# Patient Record
Sex: FEMALE | Race: WHITE
[De-identification: names, ages, dates, MRNs, and addresses within clinical notes are randomized per-mention and may not be internally consistent; named-entity substitution may affect disease eponyms.]

---

## 2018-09-05 ENCOUNTER — HOSPITAL ENCOUNTER (EMERGENCY)
Dept: HOSPITAL 97 - ER | Age: 68
Discharge: HOME | End: 2018-09-05
Payer: COMMERCIAL

## 2018-09-05 VITALS — TEMPERATURE: 98.6 F

## 2018-09-05 VITALS — DIASTOLIC BLOOD PRESSURE: 59 MMHG | SYSTOLIC BLOOD PRESSURE: 108 MMHG

## 2018-09-05 VITALS — OXYGEN SATURATION: 98 %

## 2018-09-05 DIAGNOSIS — Z88.8: ICD-10-CM

## 2018-09-05 DIAGNOSIS — N39.0: Primary | ICD-10-CM

## 2018-09-05 DIAGNOSIS — J44.9: ICD-10-CM

## 2018-09-05 DIAGNOSIS — I10: ICD-10-CM

## 2018-09-05 DIAGNOSIS — F41.9: ICD-10-CM

## 2018-09-05 DIAGNOSIS — E03.9: ICD-10-CM

## 2018-09-05 DIAGNOSIS — I50.9: ICD-10-CM

## 2018-09-05 DIAGNOSIS — Z79.82: ICD-10-CM

## 2018-09-05 LAB
BUN BLD-MCNC: 15 MG/DL (ref 7–18)
GLUCOSE SERPLBLD-MCNC: 157 MG/DL (ref 74–106)
HCT VFR BLD CALC: 39.2 % (ref 36–45)
LYMPHOCYTES # SPEC AUTO: 1.4 K/UL (ref 0.7–4.9)
MCH RBC QN AUTO: 29.6 PG (ref 27–35)
MCV RBC: 90.8 FL (ref 80–100)
PMV BLD: 9.4 FL (ref 7.6–11.3)
POTASSIUM SERPL-SCNC: 4.7 MMOL/L (ref 3.5–5.1)
RBC # BLD: 4.32 M/UL (ref 3.86–4.86)
UA COMPLETE W REFLEX CULTURE PNL UR: (no result)

## 2018-09-05 PROCEDURE — 51702 INSERT TEMP BLADDER CATH: CPT

## 2018-09-05 PROCEDURE — 93925 LOWER EXTREMITY STUDY: CPT

## 2018-09-05 PROCEDURE — 99285 EMERGENCY DEPT VISIT HI MDM: CPT

## 2018-09-05 PROCEDURE — 83880 ASSAY OF NATRIURETIC PEPTIDE: CPT

## 2018-09-05 PROCEDURE — 81015 MICROSCOPIC EXAM OF URINE: CPT

## 2018-09-05 PROCEDURE — 87088 URINE BACTERIA CULTURE: CPT

## 2018-09-05 PROCEDURE — 85025 COMPLETE CBC W/AUTO DIFF WBC: CPT

## 2018-09-05 PROCEDURE — 87186 SC STD MICRODIL/AGAR DIL: CPT

## 2018-09-05 PROCEDURE — 96375 TX/PRO/DX INJ NEW DRUG ADDON: CPT

## 2018-09-05 PROCEDURE — 71045 X-RAY EXAM CHEST 1 VIEW: CPT

## 2018-09-05 PROCEDURE — 74177 CT ABD & PELVIS W/CONTRAST: CPT

## 2018-09-05 PROCEDURE — 94640 AIRWAY INHALATION TREATMENT: CPT

## 2018-09-05 PROCEDURE — 96374 THER/PROPH/DIAG INJ IV PUSH: CPT

## 2018-09-05 PROCEDURE — 93005 ELECTROCARDIOGRAM TRACING: CPT

## 2018-09-05 PROCEDURE — 87086 URINE CULTURE/COLONY COUNT: CPT

## 2018-09-05 PROCEDURE — 36415 COLL VENOUS BLD VENIPUNCTURE: CPT

## 2018-09-05 PROCEDURE — 80048 BASIC METABOLIC PNL TOTAL CA: CPT

## 2018-09-05 PROCEDURE — 87077 CULTURE AEROBIC IDENTIFY: CPT

## 2018-09-05 NOTE — ER
Nurse's Notes                                                                                     

 [facility]                                                                

Name: Olga Figueroa                                                                                  

Age: 67 yrs                                                                                       

Sex: Female                                                                                       

: 1950                                                                                   

MRN: [id number]                                                                                   

Arrival Date: 2018                                                                          

Time: 13:20                                                                                       

Account#: [id number]                                                                            

Bed 24                                                                                            

Private MD:                                                                                       

Diagnosis: Urinary tract infection, site not specified                                            

                                                                                                  

Presentation:                                                                                     

                                                                                             

13:21 Presenting complaint: EMS states: possible bowel obstruction. Transition of care:       tl3 

      patient was received from another setting of care (long-term care facility), [facility]. Onset of symptoms is unknown. Risk Assessment: Do you want to hurt yourself or     

      someone else? Patient reports no desire to harm self or others. Initial Sepsis Screen:      

      Does the patient meet any 2 criteria? No. Patient's initial sepsis screen is negative.      

      Does the patient have a suspected source of infection? No. Patient's initial sepsis         

      screen is negative. Care prior to arrival:                                                  

13:21 Method Of Arrival: EMS: [facility]                                                tl3 

13:21 Acuity: LO 3                                                                           tl3 

                                                                                                  

Triage Assessment:                                                                                

13:31 General: Appears uncomfortable, obese, Behavior is calm, cooperative, appropriate for   tl3 

      age. Pain: Complains of pain in abdomen. EENT: No signs and/or symptoms were reported       

      regarding the EENT system. Neuro: Level of Consciousness is awake, alert, obeys             

      commands. Cardiovascular: Patient's skin is warm and dry. Respiratory: Airway is patent     

      Respiratory effort is even, unlabored, Respiratory pattern is regular, symmetrical,         

      Breath sounds are diminished bilaterally. in left lower lobe, right lower lobe, left        

      posterior lower lobe and right posterior lower lobe. GI: Abdomen is round distended,        

      Last BM was 2018. Bowel sounds present X 4 quads. abdomen firm. :           

      Reports frequent UTI. Derm: Skin is dusky, red, Skin temperature is cold bilateral          

      lower legs cold to touch, red/purple in color. Musculoskeletal: pt not ambulatory.          

                                                                                                  

Historical:                                                                                       

- Allergies:                                                                                      

13:31 oxybutynin;                                                                             tl3 

- Home Meds:                                                                                      

13:31 simethicone 80 mg Oral chew every 8 hours [Active]; pantoprazole 40 mg oral TbEC 1 tab  tl3 

      2 times per day [Active]; aspirin 81 mg Oral TbEC 1 tab once daily [Active]; prednisone     

      10 mg oral tab once daily [Active]; cetirizine 10 mg oral chew 1 tab once daily             

      [Active]; fluticasone 50 mcg/actuation nasal spsn 1 spray 2 times per day [Active];         

      ipratropium-albuterol 0.5 mg-3 mg(2.5 mg base)/3 mL Inhl nebu 3 mL 4 times per day          

      [Active]; temazepam 15 mg Oral cap 1 cap once daily [Active]; hydroxyzine HCl 25 mg         

      Oral tab 1 tab 4 times per day [Active]; tramadol 50 mg Oral tab 1 tab every 6 hours        

      for Pain [Active];                                                                          

- PMHx:                                                                                           

13:31 Anxiety; Cellulitis; cognitive communication deficit; constipation; COPD; HEART         3 

      FAILURE; Hypothyroidism; Hypertension; PERIPHERAL NEUROPATHY;                               

                                                                                                  

- Immunization history:: Adult Immunizations up to date.                                          

- Social history:: Smoking status: unknown.                                                       

- Ebola Screening: : No symptoms or risks identified at this time.                                

                                                                                                  

                                                                                                  

Screenin:36 Abuse screen: Denies threats or abuse. Nutritional screening: No deficits noted.        [facility] 

      Tuberculosis screening: No symptoms or risk factors identified. Fall Risk Secondary         

      diagnosis (15 points) impaired mobility.                                                    

                                                                                                  

Assessment:                                                                                       

13:36 Reassessment: No changes from previously documented assessment. pt brought in from [address] for possible bowel obstruction, pt required total changing due to being             

      saturated in urine.                                                                         

14:38 Reassessment: Patient appears in no apparent distress at this time. No changes from     [facility] 

      previously documented assessment. Patient and/or family updated on plan of care and         

      expected duration. Pain level reassessed. Patient is alert, oriented x 3, equal             

      unlabored respirations, skin warm/dry/pink. pt resting quietly, no needs at this time.      

16:11 Reassessment: Patient appears in no apparent distress at this time. No changes from     3 

      previously documented assessment. Patient and/or family updated on plan of care and         

      expected duration. Pain level reassessed. Patient is alert, oriented x 3, equal             

      unlabored respirations, skin warm/dry/pink. [facility] called for pt transport back to     

      facility.                                                                                   

17:12 Reassessment: Patient appears in no apparent distress at this time. No changes from     [facility] 

      previously documented assessment. Patient and/or family updated on plan of care and         

      expected duration. Pain level reassessed. Patient is alert, oriented x 3, equal             

      unlabored respirations, skin warm/dry/pink. ultrasound at bedside.                          

20:49 Reassessment: Patient appears in no apparent distress at this time. No changes from     tl3 

      previously documented assessment. Patient and/or family updated on plan of care and         

      expected duration. Pain level reassessed. Patient is alert, oriented x 3, equal             

      unlabored respirations, skin warm/dry/pink.                                                 

                                                                                                  

Vital Signs:                                                                                      

13:31  / 54; Pulse 75; Resp 18; Temp 98.6(O); Pulse Ox 98% on 2 lpm NC;                 tl3 

14:38  / 67; Pulse 73; Resp 16; Pulse Ox 99% on 2 lpm NC;                               tl3 

16:11  / 59; Pulse 76; Resp 18; Pulse Ox 99% on 2 lpm NC;                               tl3 

17:12 Pulse 82; Resp 18; Pulse Ox 99% 2 lpm ;                                                 tl3 

20:49  / 59; Pulse 76; Resp 18; Pulse Ox 98% on 2 lpm NC;                               tl3 

                                                                                                  

ED Course:                                                                                        

13:20 Patient arrived in ED.                                                                  iw  

13:21 Garima Schilling, RN is Primary Nurse.                                                     tl3 

13:23 Triage completed.                                                                       tl3 

13:27 Tia Killian FNP-C is PHCP.                                                        kb  

13:27 Mehrdad Dash MD is Attending Physician.                                             kb  

13:31 Arm band placed on left wrist.                                                          tl3 

13:36 Patient has correct armband on for positive identification. Placed in gown. Bed in low  tl3 

      position. Call light in reach. Side rails up X2. Pulse ox on. NIBP on. Warm blanket         

      given. Pillow given. sheets, bed clothes all changed due to being saturated in urine.       

13:36 No provider procedures requiring assistance completed.                                  tl3 

14:18 Inserted saline lock: 24 gauge in left ,using aseptic technique. shoulder Blood         tl3 

      collected.                                                                                  

14:37 Straight cath inserted, using sterile technique, Specimen obtained. 8fr.                tl3 

15:02 Patient moved to CT.                                                                    nj  

15:25 CT Abd/Pelvis - W/Contrast In Process Unspecified.                                      EDMS

15:27 CT completed. Patient tolerated procedure well. Patient moved back from CT.             nj  

17:05 Chest Single View XRAY In Process Unspecified.                                          EDMS

17:39 US LE Arterial Bilateral In Process Unspecified.                                        EDMS

20:49 IV discontinued, intact, bleeding controlled, No redness/swelling at site. Pressure     tl3 

      dressing applied.                                                                           

                                                                                                  

Administered Medications:                                                                         

16:01 Drug: Rocephin - (cefTRIAXone) 1 grams {Note: IVP.} Route: IVPB; Infused Over: 5 mins;  tl3 

      Site: Other;                                                                                

16:01 Follow up: IV Status: Completed infusion; IV Intake: 20ml                               tl3 

17:11 Drug: Lasix 40 mg Route: IVP; Infused Over: 2 mins; Site: Other;                        tl3 

18:39 Follow up: Response: No adverse reaction                                                tl3 

19:27 Drug: DuoNeb (3:1) (2.5 mg - 0.5 mg) 3 ml Route: Nebulizer;                             tl3 

20:37 Follow up: Response: No adverse reaction                                                tl3 

20:36 Drug: traMADol 50 mg Route: PO;                                                         tl3 

20:37 Follow up: Response: Medication administered at discharge.                              tl3 

                                                                                                  

                                                                                                  

Intake:                                                                                           

16:01 IV: 20ml; Total: 20ml.                                                                  tl3 

                                                                                                  

Outcome:                                                                                          

15:53 Discharge ordered by MD.                                                                kb  

20:32 Discharge ordered by MD.                                                                kb  

21:28 Discharged to nursing home. Report called to  [facility]                               tl3 

21:28 Condition: stable                                                                           

21:28 Discharge instructions given to patient, family, Instructed on discharge instructions,      

      follow up and referral plans. Demonstrated understanding of instructions, follow-up         

      care, medications, Prescriptions given X 1.                                                 

21:29 Patient left the ED.                                                                    tl3 

                                                                                                  

Addendum:                                                                                         

2018                                                                                        

     11:20 Addendum: Culture Results: Positive urine culture. Bacteria is resistant to, has        i
w

           intermediate sensitivity, or is not tested against prescribed antibiotics. Report given

           to DENISE for further evaluation and then to ED Manager for follow up with patient. Phone 

           call Attempt #1 called [facility], faxed culture report to Mary Curry, Attn:Kezia.  

                                                                                                  

Signatures:                                                                                       

Dispatcher [employer]                           EDTia Palomares, FNP-C                 FNP-Ckb                                                   

Park Hoff, RN                     RN   Jose Daniel Riggs Tammy, RN                       RN   tl3                                                  

                                                                                                  

Corrections: (The following items were deleted from the chart)                                    

                                                                                             

17:13 17:12 Pulse 82bpm; Resp 18bpm; Pulse Ox 99%; tl3                                        tl3 

                                                                                                  

**************************************************************************************************

## 2018-09-05 NOTE — EDPHYS
Physician Documentation                                                                           

 [facility]                                                                

Name: Olga Figueroa                                                                                  

Age: 67 yrs                                                                                       

Sex: Female                                                                                       

: 1950                                                                                   

MRN: [id number]                                                                                   

Arrival Date: 2018                                                                          

Time: 13:20                                                                                       

Account#: [id number]                                                                            

Bed 24                                                                                            

Private MD:                                                                                       

ED Physician Mehrdad Dash                                                                      

HPI:                                                                                              

                                                                                             

14:57 This 67 yrs old  Female presents to ER via EMS with complaints of abd pain.    kb  

14:57 The patient presents with abdominal pain. Onset: The symptoms/episode began/occurred 2  kb  

      week(s) ago. The symptoms do not radiate. Associated signs and symptoms: Pertinent          

      positives: "fullness". The symptoms are described as constant. Modifying factors: The       

      symptoms are alleviated by nothing, the symptoms are aggravated by nothing. Severity of     

      pain: At its worst the pain was mild moderate in the emergency department the pain is       

      unchanged. The patient has not experienced similar symptoms in the past. The patient        

      has not recently seen a physician. Pt sent from [facility] to rule out bowel               

      obstruction. Pt states she has felt fullness in abd for about 2 weeks. Last BM was          

      yesterday and normal. Hyperactive bowel sounds.                                             

                                                                                                  

Historical:                                                                                       

- Allergies:                                                                                      

13:31 oxybutynin;                                                                             tl3 

- Home Meds:                                                                                      

13:31 simethicone 80 mg Oral chew every 8 hours [Active]; pantoprazole 40 mg oral TbEC 1 tab  tl3 

      2 times per day [Active]; aspirin 81 mg Oral TbEC 1 tab once daily [Active]; prednisone     

      10 mg oral tab once daily [Active]; cetirizine 10 mg oral chew 1 tab once daily             

      [Active]; fluticasone 50 mcg/actuation nasal spsn 1 spray 2 times per day [Active];         

      ipratropium-albuterol 0.5 mg-3 mg(2.5 mg base)/3 mL Inhl nebu 3 mL 4 times per day          

      [Active]; temazepam 15 mg Oral cap 1 cap once daily [Active]; hydroxyzine HCl 25 mg         

      Oral tab 1 tab 4 times per day [Active]; tramadol 50 mg Oral tab 1 tab every 6 hours        

      for Pain [Active];                                                                          

- PMHx:                                                                                           

13:31 Anxiety; Cellulitis; cognitive communication deficit; constipation; COPD; HEART         tl3 

      FAILURE; Hypothyroidism; Hypertension; PERIPHERAL NEUROPATHY;                               

                                                                                                  

- Immunization history:: Adult Immunizations up to date.                                          

- Social history:: Smoking status: unknown.                                                       

- Ebola Screening: : No symptoms or risks identified at this time.                                

                                                                                                  

                                                                                                  

ROS:                                                                                              

14:19 Constitutional: Negative for fever, chills, and weight loss, Cardiovascular: Negative   kb  

      for chest pain, palpitations, and edema, Respiratory: Negative for shortness of breath,     

      cough, wheezing, and pleuritic chest pain, Back: Negative for injury and pain,              

      MS/Extremity: Negative for injury and deformity, Skin: Negative for injury, rash, and       

      discoloration, Neuro: Negative for headache, weakness, numbness, tingling, and seizure.     

14:19 Abdomen/GI: Positive for abdominal pain.                                                    

                                                                                                  

Exam:                                                                                             

14:55 Constitutional:  This is a well developed, well nourished patient who is awake, alert,  kb  

      and in no acute distress. Chest/axilla:  Normal chest wall appearance and motion.           

      Nontender with no deformity.  No lesions are appreciated. Cardiovascular:  Regular rate     

      and rhythm with a normal S1 and S2.  No gallops, murmurs, or rubs.  Normal PMI, no JVD.     

       No pulse deficits. Respiratory:  Lungs have equal breath sounds bilaterally, clear to      

      auscultation and percussion.  No rales, rhonchi or wheezes noted.  No increased work of     

      breathing, no retractions or nasal flaring.                                                 

14:55 Abdomen/GI: Inspection: obese Bowel sounds: normal, in all quadrants, Palpation:            

      moderate abdominal tenderness, in all quadrants.                                            

18:50 Respiratory:  Respirations: labored breathing, Breath sounds: wheezing: expiratory that kb  

      is mild, that is moderate, is heard diffusely, pt normally gets neb treatments              

      throughout the day for COPD.                                                                

                                                                                                  

Vital Signs:                                                                                      

13:31  / 54; Pulse 75; Resp 18; Temp 98.6(O); Pulse Ox 98% on 2 lpm NC;                 tl3 

14:38  / 67; Pulse 73; Resp 16; Pulse Ox 99% on 2 lpm NC;                               tl3 

16:11  / 59; Pulse 76; Resp 18; Pulse Ox 99% on 2 lpm NC;                               tl3 

17:12 Pulse 82; Resp 18; Pulse Ox 99% 2 lpm ;                                                 tl3 

20:49  / 59; Pulse 76; Resp 18; Pulse Ox 98% on 2 lpm NC;                               tl3 

                                                                                                  

MDM:                                                                                              

13:29 Patient medically screened.                                                             kb  

14:55 Data reviewed: vital signs, nurses notes. Data interpreted: Pulse oximetry: on 2L(s)    kb  

      per nasal canula, on home O2 is 99 %. Interpretation: normal.                               

15:52 Counseling: I had a detailed discussion with the patient and/or guardian regarding: the kb  

      historical points, exam findings, and any diagnostic results supporting the                 

      discharge/admit diagnosis, lab results, radiology results, the need for outpatient          

      follow up, a family practitioner, to return to the emergency department if symptoms         

      worsen or persist or if there are any questions or concerns that arise at home.             

16:29 ED course: Pt's daughter arrived and said the patient had been having shortness of      kb  

      breath and she thinks she is more swollen than normal. States they normally have to         

      admit her for IV lasix when she holds fluid.                                                

                                                                                                  

                                                                                             

13:45 Order name: Basic Metabolic Panel; Complete Time: 14:54                                 kb  

                                                                                             

13:45 Order name: CBC with Diff; Complete Time: 14:54                                         kb  

                                                                                             

13:45 Order name: Urine Microscopic Only; Complete Time: 15:32                                kb  

                                                                                             

14:55 Order name: CT Abd/Pelvis - W/Contrast; Complete Time: 15:45                            kb  

                                                                                             

15:29 Order name: Urine Culture                                                               EDMS

                                                                                             

16:31 Order name: BNP; Complete Time: 19:14                                                   kb  

                                                                                             

13:45 Order name: IV Saline Lock; Complete Time: 14:18                                        kb  

                                                                                             

16:31 Order name: Chest Single View XRAY; Complete Time: 17:47                                kb  

                                                                                             

16:37 Order name: US LE Arterial Bilateral; Complete Time: 17:51                              kb  

                                                                                             

18:19 Order name: EKG; Complete Time: 18:20                                                   kb  

                                                                                             

13:45 Order name: Labs collected and sent; Complete Time: 14:18                               kb  

                                                                                             

13:45 Order name: Urine Dipstick-Ancillary (obtain specimen); Complete Time: 14:41            kb  

                                                                                             

14:41 Order name: Cath; Complete Time: 14:41                                                  tl3 

                                                                                             

18:19 Order name: EKG - Nurse/Tech; Complete Time: 20:37                                      kb  

                                                                                                  

Administered Medications:                                                                         

16:01 Drug: Rocephin - (cefTRIAXone) 1 grams {Note: IVP.} Route: IVPB; Infused Over: 5 mins;  tl3 

      Site: Other;                                                                                

16:01 Follow up: IV Status: Completed infusion; IV Intake: 20ml                               tl3 

17:11 Drug: Lasix 40 mg Route: IVP; Infused Over: 2 mins; Site: Other;                        tl3 

18:39 Follow up: Response: No adverse reaction                                                tl3 

19:27 Drug: DuoNeb (3:1) (2.5 mg - 0.5 mg) 3 ml Route: Nebulizer;                             tl3 

20:37 Follow up: Response: No adverse reaction                                                tl3 

20:36 Drug: traMADol 50 mg Route: PO;                                                         tl3 

20:37 Follow up: Response: Medication administered at discharge.                              tl3 

                                                                                                  

                                                                                                  

Disposition:                                                                                      

                                                                                             

06:49 Co-signature as Attending Physician, Mehrdad Dash MD I agree with the assessment and  sami 

      plan of care.                                                                               

                                                                                                  

Disposition:                                                                                      

18 20:32 Discharged to Home. Impression: Urinary tract infection, site not specified.       

- Condition is Stable.                                                                            

- Discharge Instructions: Urinary Tract Infection, Adult, Easy-to-Read.                           

- Prescriptions for Augmentin 875- 125 mg Oral Tablet - take 1 tablet by ORAL route               

  every 12 hours for 10 days; 20 tablet.                                                          

- Medication Reconciliation Form, Thank You Letter, Antibiotic Education, Prescription            

  Opioid Use, SBAR form form.                                                                     

- Follow up: Emergency Department; When: As needed; Reason: Worsening of condition.               

  Follow up: Private Physician; When: 2 - 3 days; Reason: Recheck today's complaints,             

  Continuance of care, Re-evaluation by your physician.                                           

                                                                                                  

                                                                                                  

                                                                                                  

Signatures:                                                                                       

Dispatcher [employer]                           EDMS                                                 

Tia Killian, FNP-C                 MOP-Mehrdad Wheeler MD MD cha Lowrey, Tammy, RN                       RN   tl3                                                  

                                                                                                  

Corrections: (The following items were deleted from the chart)                                    

                                                                                             

14:57 14:55 Constitutional: This is a well developed, well nourished patient who is awake,    kb  

      alert, and in no acute distress. Chest/axilla: Normal chest wall appearance and motion.     

      Nontender with no deformity. No lesions are appreciated. Cardiovascular: Regular rate       

      and rhythm with a normal S1 and S2. No gallops, murmurs, or rubs. Normal PMI, no JVD.       

      No pulse deficits. Respiratory: Lungs have equal breath sounds bilaterally, clear to        

      auscultation and percussion. No rales, rhonchi or wheezes noted. No increased work of       

      breathing, no retractions or nasal flaring. MS/ Extremity: Pulses equal, no cyanosis.       

      Neurovascular intact. Full, normal range of motion. Neuro: Awake and alert, GCS 15,         

      oriented to person, place, time, and situation. Cranial nerves II-XII grossly intact.       

      Motor strength 5/5 in all extremities. Sensory grossly intact. Cerebellar exam normal.      

      Normal gait. kb                                                                             

16:29 15:53 2018 15:53 Discharged to Home. Impression: Urinary tract infection, site    kb  

      not specified. Condition is Stable. Forms are Medication Reconciliation Form, Thank You     

      Letter, Antibiotic Education, Prescription Opioid Use. Follow up: Emergency Department;     

      When: As needed; Reason: Worsening of condition. Follow up: Private Physician; When: 2      

      - 3 days; Reason: Recheck today's complaints, Continuance of care, Re-evaluation by         

      your physician. kb                                                                          

18:50 14:55 Constitutional: This is a well developed, well nourished patient who is awake,    kb  

      alert, and in no acute distress. Chest/axilla: Normal chest wall appearance and motion.     

      Nontender with no deformity. No lesions are appreciated. Cardiovascular: Regular rate       

      and rhythm with a normal S1 and S2. No gallops, murmurs, or rubs. Normal PMI, no JVD.       

      No pulse deficits. Respiratory: Lungs have equal breath sounds bilaterally, clear to        

      auscultation and percussion. No rales, rhonchi or wheezes noted. No increased work of       

      breathing, no retractions or nasal flaring. kb                                              

18:51 14:55 Data interpreted: Pulse oximetry: on room air is 99 %. Interpretation: normal. kb kb  

18:52 14:55 Data interpreted: Pulse oximetry: on on home O2 is 99 %. Interpretation: normal.  kb  

      kb                                                                                          

21:29 20:32 2018 20:32 Discharged to Home. Impression: Urinary tract infection, site    tl3 

      not specified. Condition is Stable. Discharge Instructions: Urinary Tract Infection,        

      Adult, Easy-to-Read. Prescriptions for Macrobid 100 mg Oral Capsule - take 1 capsule by     

      ORAL route every 12 hours for 7 days; 14 capsule, Augmentin 875-125 mg Oral Tablet -        

      take 1 tablet by ORAL route every 12 hours for 10 days; 20 tablet. and Forms are            

      Medication Reconciliation Form, Thank You Letter, Antibiotic Education, Prescription        

      Opioid Use. Follow up: Emergency Department; When: As needed; Reason: Worsening of          

      condition. Follow up: Private Physician; When: 2 - 3 days; Reason: Recheck today's          

      complaints, Continuance of care, Re-evaluation by your physician. kb                        

                                                                                                  

**************************************************************************************************

## 2018-09-05 NOTE — RAD REPORT
EXAM DESCRIPTION:  CTAbdomen   Pelvis W Contrast - 9/5/2018 3:25 pm

 

CLINICAL HISTORY:  Abdominal pain.

IV  contrast only;Abd pain

 

COMPARISON:  CT ABD PELVIS W CONTRAST dated 3/23/2015

 

TECHNIQUE:  Biphasic CT imaging of the abdomen and pelvis was performed with 100 ml non-ionic IV cont
rast.

 

All CT scans are performed using dose optimization technique as appropriate and may include automated
 exposure control or mA/KV adjustment according to patient size.

 

FINDINGS:  The lung bases are clear.

 

The liver demonstrates no focal mass or biliary dilatation. Cholecystectomy clips are seen. Spleen, p
ancreas, adrenal glands and kidneys show no acute process.

 

No bowel obstruction, free air, free fluid or abscess.  The appendix is normal.  No evidence of signi
ficant lymphadenopathy. Moderate aortic atherosclerosis.

 

Left total hip arthroplasty is noted.

 

IMPRESSION:  No acute intra-abdominal or pelvic finding.

## 2018-09-05 NOTE — RAD REPORT
EXAM DESCRIPTION:  US - Lower Extremity Arterial Bilat - 9/5/2018 5:39 pm

 

CLINICAL HISTORY:  Claudication

 

COMPARISON:  Lower Extremity Arterial Bilat dated 1/28/2017

 

TECHNIQUE:  Bilateral lower extremity arterial Doppler examination was performed.

 

FINDINGS:  ABIs were not requested.

 

Triphasic waveforms are noted in the right lower extremity arterial system. No high-grade stenosis or
 occlusion is suspected.

 

Triphasic and biphasic waveforms are seen in the left lower extremity arterial system. No high-grade 
stenosis or occlusion suspected.

 

IMPRESSION:  No evidence of an arterial occlusion or high-grade stenosis.

## 2018-09-06 NOTE — EKG
Test Date:    2018-09-05               Test Time:    20:21:36

Technician:   TL                                     

                                                     

MEASUREMENT RESULTS:                                       

Intervals:                                           

Rate:         96                                     

SD:           122                                    

QRSD:         84                                     

QT:           426                                    

QTc:          538                                    

Axis:                                                

P:            66                                     

SD:           122                                    

QRS:          28                                     

T:            132                                    

                                                     

INTERPRETIVE STATEMENTS:                                       

                                                     

Normal sinus rhythm

Right atrial enlargement

Low voltage QRS

ST & T wave abnormality, consider lateral ischemia

Prolonged QT

Abnormal ECG

Compared to ECG 08/13/2017 13:54:04

Low QRS voltage now present

ST (T wave) deviation now present

Possible ischemia now present

Prolonged QT interval now present

Sinus tachycardia no longer present

Myocardial infarct finding no longer present



Electronically Signed On 09-06-18 06:47:12 CDT by Elias Barcenas

## 2019-03-01 ENCOUNTER — HOSPITAL ENCOUNTER (INPATIENT)
Dept: HOSPITAL 97 - ER | Age: 69
LOS: 3 days | Discharge: NURSING, REHAB OR LONG-TERM CARE | DRG: 190 | End: 2019-03-04
Attending: FAMILY MEDICINE | Admitting: HOSPITALIST
Payer: COMMERCIAL

## 2019-03-01 VITALS — BODY MASS INDEX: 36.3 KG/M2

## 2019-03-01 DIAGNOSIS — M54.9: ICD-10-CM

## 2019-03-01 DIAGNOSIS — E24.2: ICD-10-CM

## 2019-03-01 DIAGNOSIS — G89.29: ICD-10-CM

## 2019-03-01 DIAGNOSIS — Z99.81: ICD-10-CM

## 2019-03-01 DIAGNOSIS — Z87.891: ICD-10-CM

## 2019-03-01 DIAGNOSIS — I11.0: ICD-10-CM

## 2019-03-01 DIAGNOSIS — E03.9: ICD-10-CM

## 2019-03-01 DIAGNOSIS — J96.22: ICD-10-CM

## 2019-03-01 DIAGNOSIS — E78.5: ICD-10-CM

## 2019-03-01 DIAGNOSIS — K21.9: ICD-10-CM

## 2019-03-01 DIAGNOSIS — E66.9: ICD-10-CM

## 2019-03-01 DIAGNOSIS — J44.1: Primary | ICD-10-CM

## 2019-03-01 DIAGNOSIS — F32.9: ICD-10-CM

## 2019-03-01 DIAGNOSIS — I50.32: ICD-10-CM

## 2019-03-01 LAB
ALBUMIN SERPL BCP-MCNC: 4 G/DL (ref 3.4–5)
ALP SERPL-CCNC: 114 U/L (ref 45–117)
ALT SERPL W P-5'-P-CCNC: 27 U/L (ref 12–78)
AST SERPL W P-5'-P-CCNC: 13 U/L (ref 15–37)
BLD SMEAR INTERP: (no result)
BUN BLD-MCNC: 16 MG/DL (ref 7–18)
GLUCOSE SERPLBLD-MCNC: 171 MG/DL (ref 74–106)
HCT VFR BLD CALC: 43.2 % (ref 36–45)
INR BLD: 1
LYMPHOCYTES # SPEC AUTO: 1.3 K/UL (ref 0.7–4.9)
MAGNESIUM SERPL-MCNC: 2.4 MG/DL (ref 1.8–2.4)
MORPHOLOGY BLD-IMP: (no result)
NT-PROBNP SERPL-MCNC: 198 PG/ML (ref ?–125)
PMV BLD: 9.2 FL (ref 7.6–11.3)
POTASSIUM SERPL-SCNC: 4.7 MMOL/L (ref 3.5–5.1)
RBC # BLD: 4.61 M/UL (ref 3.86–4.86)
TROPONIN (EMERG DEPT USE ONLY): < 0.02 NG/ML (ref 0–0.04)
UA COMPLETE W REFLEX CULTURE PNL UR: (no result)

## 2019-03-01 PROCEDURE — 80076 HEPATIC FUNCTION PANEL: CPT

## 2019-03-01 PROCEDURE — 71045 X-RAY EXAM CHEST 1 VIEW: CPT

## 2019-03-01 PROCEDURE — 82805 BLOOD GASES W/O2 SATURATION: CPT

## 2019-03-01 PROCEDURE — 99285 EMERGENCY DEPT VISIT HI MDM: CPT

## 2019-03-01 PROCEDURE — 94668 MNPJ CHEST WALL SBSQ: CPT

## 2019-03-01 PROCEDURE — 80048 BASIC METABOLIC PNL TOTAL CA: CPT

## 2019-03-01 PROCEDURE — 93005 ELECTROCARDIOGRAM TRACING: CPT

## 2019-03-01 PROCEDURE — 83605 ASSAY OF LACTIC ACID: CPT

## 2019-03-01 PROCEDURE — 94667 MNPJ CHEST WALL 1ST: CPT

## 2019-03-01 PROCEDURE — 84484 ASSAY OF TROPONIN QUANT: CPT

## 2019-03-01 PROCEDURE — 83735 ASSAY OF MAGNESIUM: CPT

## 2019-03-01 PROCEDURE — 80053 COMPREHEN METABOLIC PANEL: CPT

## 2019-03-01 PROCEDURE — 85610 PROTHROMBIN TIME: CPT

## 2019-03-01 PROCEDURE — 96374 THER/PROPH/DIAG INJ IV PUSH: CPT

## 2019-03-01 PROCEDURE — 51702 INSERT TEMP BLADDER CATH: CPT

## 2019-03-01 PROCEDURE — 36415 COLL VENOUS BLD VENIPUNCTURE: CPT

## 2019-03-01 PROCEDURE — 81015 MICROSCOPIC EXAM OF URINE: CPT

## 2019-03-01 PROCEDURE — 85025 COMPLETE CBC W/AUTO DIFF WBC: CPT

## 2019-03-01 PROCEDURE — 84145 PROCALCITONIN (PCT): CPT

## 2019-03-01 PROCEDURE — 81003 URINALYSIS AUTO W/O SCOPE: CPT

## 2019-03-01 PROCEDURE — 83880 ASSAY OF NATRIURETIC PEPTIDE: CPT

## 2019-03-01 PROCEDURE — 94640 AIRWAY INHALATION TREATMENT: CPT

## 2019-03-01 PROCEDURE — 87040 BLOOD CULTURE FOR BACTERIA: CPT

## 2019-03-01 RX ADMIN — METHYLPREDNISOLONE SODIUM SUCCINATE SCH MG: 125 INJECTION, POWDER, FOR SOLUTION INTRAMUSCULAR; INTRAVENOUS at 23:28

## 2019-03-01 NOTE — ER
Nurse's Notes                                                                                     

 [facility]                                                                

Name: Olga Figueroa                                                                                  

Age: 68 yrs                                                                                       

Sex: Female                                                                                       

: 1950                                                                                   

MRN: [id number]                                                                                   

Arrival Date: 2019                                                                          

Time: 18:17                                                                                       

Account#: [id number]                                                                            

Bed 6                                                                                             

Private MD:                                                                                       

Diagnosis: Chronic obstructive pulmonary disease with (acute) exacerbation                        

                                                                                                  

Presentation:                                                                                     

                                                                                             

18:17 Presenting complaint: EMS states: Pt having shortness of breath worsening today. Pt's   jl7 

      daughter reports she is confused and hallucinating. Transition of care: patient was         

      received from another setting of care (long-term care [facility]), [facility]. Onset of      

      symptoms was 2019. Risk Assessment: Do you want to hurt yourself or someone       

      else? Patient reports no desire to harm self or others. Initial Sepsis Screen: Does the     

      patient meet any 2 criteria? No. Patient's initial sepsis screen is negative. Does the      

      patient have a suspected source of infection? No. Patient's initial sepsis screen is        

      negative. Care prior to arrival: Medication(s) given: Albuterol Neb x 1, Atrovent Neb x     

      1.                                                                                          

18:17 Method Of Arrival: EMS: [facility]                                                [facility] 

18:17 Acuity: LO 3                                                                           jl7 

                                                                                                  

Triage Assessment:                                                                                

18:32 General: Appears in no apparent distress. uncomfortable, Behavior is cooperative. Pain: jl7 

      Complains of pain in mid-sternal area Pain radiates to "straight through to the back."      

      Pain currently is 8 out of 10 on a pain scale. Quality of pain is described as sharp,       

      Pain began 1 day ago. Is continuous. EENT: No signs and/or symptoms were reported           

      regarding the EENT system. Neuro: Level of Consciousness is awake, alert, obeys             

      commands, Oriented to person, place. Cardiovascular: Patient's skin is warm and dry.        

      Respiratory: Reports shortness of breath at rest Airway is patent Respiratory effort is     

      even, unlabored, Respiratory pattern is regular, symmetrical, Onset: The                    

      symptoms/episode began/occurred at an unknown time. the patient has moderate shortness      

      of breath. GI: No signs and/or symptoms were reported involving the gastrointestinal        

      system. : No signs and/or symptoms were reported regarding the genitourinary system.      

                                                                                                  

Historical:                                                                                       

- Allergies:                                                                                      

18:32 oxybutynin;                                                                             jl7 

- Home Meds:                                                                                      

18:32 temazepam 15 mg Oral cap 1 cap once daily [Active]; pantoprazole 40 mg Oral TbEC 1 tab  jl7 

      2 times per day [Active]; Triamcinolone Acetonide Topical [Active]; spironolactone 50       

      mg Oral tab 1 tab once daily [Active]; gabapentin 400 mg oral cap 3 times per day           

      [Active]; Cymbalta oral 90 mg oral once daily [Active]; Lasix 20 mg Oral tab 1 tab 3        

      times per day [Active]; alprazolam 1 mg Oral tab 1 tab 3 times per day [Active];            

      Seroquel 100 mg Oral tab 2 tabs daily [Active]; levothyroxine 25 mcg tab 1 tab once         

      daily [Active]; atorvastatin 10 mg oral tab 1 tab once daily [Active]; Myrbetriq 25 mg      

      oral Tb24 1 tab once daily [Active]; fluticasone 50 mcg/actuation nasal spsn 1 spray 2      

      times per day [Active]; cetirizine 10 mg Oral chew 1 tab once daily [Active]; aspirin       

      81 mg Oral TbEC 1 tab once daily [Active]; prednisone 10 mg Oral tab once daily             

      [Active]; ipratropium-albuterol 0.5 mg-3 mg(2.5 mg base)/3 mL Inhl nebu 3 mL 4 times        

      per day [Active]; tramadol 50 mg Oral tab 1 tab every 6 hours for Pain [Active];            

      hydroxyzine HCl 25 mg Oral tab 1 tab 4 times per day [Active]; simethicone 80 mg Oral       

      chew every 8 hours [Active];                                                                

- PMHx:                                                                                           

18:32 Anxiety; cognitive communication deficit; Cellulitis; constipation; COPD; HEART         jl7 

      FAILURE; Hypertension; Hypothyroidism; PERIPHERAL NEUROPATHY; Bipolar disorder; Anemia;     

      Depression; Emphysema; Dementia;                                                            

- PSHx:                                                                                           

18:32 left Hip;                                                                               jl7 

                                                                                                  

- Immunization history:: Adult Immunizations unknown.                                             

- Social history:: Smoking status: unknown.                                                       

- Ebola Screening: : No symptoms or risks identified at this time.                                

                                                                                                  

                                                                                                  

Screenin:48 Abuse screen: Denies threats or abuse. Denies injuries from another. Nutritional        jl7 

      screening: No deficits noted. Tuberculosis screening: No symptoms or risk factors           

      identified. Fall Risk IV access (20 points). Total Acuna Fall Scale indicates No Risk       

      (0-24 pts).                                                                                 

                                                                                                  

Assessment:                                                                                       

18:48 General: Izabela, pt's daughter, called and reported "She is confused, the doctor         jl7 

      increased her water pill but she has fluid pockets on her thighs.". General: See triage     

      assessment. Cardiovascular: Rhythm is regular. Respiratory: Airway is patent                

      Respiratory effort is even, labored, Respiratory pattern is congestion auscultated.         

19:25 General: Appears uncomfortable, Behavior is appropriate for age. General: Appears       ea  

      obese. Neuro: Level of Consciousness is awake, alert, Oriented to person, place.            

      Cardiovascular: Patient's skin is warm and dry. Respiratory: Airway is patent               

      Respiratory effort is even, labored, Respiratory pattern is regular, symmetrical,           

      Breath sounds are coarse bilaterally. GI: Abdomen is distended, Bowel sounds present X      

      4 quads. Derm: Discoloration noted to hudson lower extremities, dark brown bruising noted      

      to hudson forearms.                                                                            

20:00 Reassessment: Patient and/or family updated on plan of care and expected duration. Pain ea  

      level reassessed. Patient is alert, oriented x 3, equal unlabored respirations, skin        

      warm/dry/pink.                                                                              

21:50 Reassessment: Patient and/or family updated on plan of care and expected duration. Pain ea  

      level reassessed. Patient is alert, oriented x 3, equal unlabored respirations, skin        

      warm/dry/pink.                                                                              

22:30 Reassessment: Patient and/or family updated on plan of care and expected duration. Pain ea  

      level reassessed. Patient is alert, oriented x 3, equal unlabored respirations, skin        

      warm/dry/pink.                                                                              

22:31 Reassessment: Report given to Herbert CHARLES on second floor.                                 ea  

                                                                                                  

Vital Signs:                                                                                      

18:32  / 75; Pulse 82; Resp 14 S; Pulse Ox 98% on Nebulizer Mask; Pain 8/10;            jl7 

18:45 Pulse 89; Resp 17 S; Temp 98.5; Pulse Ox 87% on R/A;                                    jl7 

18:47 Pulse 86; Resp 16 S; Pulse Ox 92% on 2 lpm NC;                                          jl7 

19:50  / 69; Pulse 96; Resp 22; Pulse Ox 94% on 2 lpm NC;                               tl2 

20:15  / 69; Pulse 80; Resp 21 S; Pulse Ox 96% 2 lpm ;                                  ea  

21:47  / 63; Pulse 89; Resp 22; Pulse Ox 96% on 2 lpm NC;                               ea  

22:32  / 68; Pulse 88; Resp 19; Pulse Ox 96% on 2 lpm NC;                               ea  

                                                                                                  

ED Course:                                                                                        

18:17 Patient arrived in ED.                                                                  jl7 

18:18 Abundio King PA is PHCP.                                                               jr8 

18:18 Lorne Caruso MD is Attending Physician.                                              jr8 

18:19 Triage completed.                                                                       jl7 

18:32 Arm band placed on right wrist.                                                         jl7 

18:35 EKG done, by ED staff, reviewed by Abundio BECKFORD.                                      jp3 

18:37 Inserted saline lock: 22 gauge in right hand, using aseptic technique. Blood collected. bp  

18:48 Patient has correct armband on for positive identification. Bed in low position. Call   jl7 

      light in reach. Side rails up X2. Cardiac monitor on. Pulse ox on. NIBP on.                 

19:11 X-ray completed. Portable x-ray completed in exam room. Patient tolerated procedure     mh1 

      well.                                                                                       

19:13 XRAY Chest (1 view) In Process Unspecified.                                             EDMS

19:25 Chrissie Myers, MELVIN is Primary Nurse.                                                    ea  

20:48 Mahogany Gan MD is Hospitalizing Provider.                                           jr8 

21:30 Early cath inserted, using sterile technique, 16 Fr., by me, balloon inflated, to       ea  

      gravity drainage, urine specimen collected. returned clear yellow urine. Patient            

      tolerated well.                                                                             

22:32 No provider procedures requiring assistance completed. Patient admitted, IV remains in  ea  

      place.                                                                                      

                                                                                                  

Administered Medications:                                                                         

21:47 Drug: Lasix 20 mg Route: IVP; Site: right hand;                                         ea  

22:25 Follow up: Response: No adverse reaction                                                ea  

                                                                                                  

                                                                                                  

Outcome:                                                                                          

20:48 Decision to Hospitalize by Provider.                                                    jr8 

21:00 Instructed on the need for admit.                                                       ea  

22:36 Admitted to Med/surg accompanied by tech, room 231, with oxygen, with chart, Report     ea  

      called to  Herbert CHARLES on second floor.                                                        

22:36 Condition: stable                                                                           

23:03 Patient left the ED.                                                                    ea  

                                                                                                  

Signatures:                                                                                       

Dispatcher [employer]                           EDMS                                                 

Britt Tinajero                               1                                                  

Abundio King PA PA   jr8                                                  

Vickie Blanco, RN                        RN   tl2                                                  

Tonya Archer RN                        RN   jl7                                                  

Chrissie Myers, Norris Jacobo RN, ea, RN                      RN   [place]                              jp3                                                  

                                                                                                  

Corrections: (The following items were deleted from the chart)                                    

18:47 18:32  / 44; Pulse 82bpm; Resp 14bpm; Spontaneous; Pulse Ox 98% Nebulizer Mask;   jl7 

      Pain 8/10; jl7                                                                              

18:48 18:17 Presenting complaint: EMS states: Pt having shortness of breath worsening today   jl7 

      jl7                                                                                         

                                                                                                  

**************************************************************************************************

## 2019-03-01 NOTE — RAD REPORT
EXAM DESCRIPTION:  RAD - Chest Single View - 3/1/2019 7:14 pm

 

CLINICAL HISTORY:  DYSPNEA

Chest pain.

 

COMPARISON:  Chest Single View dated 9/5/2018; Chest Single View dated 2/10/2018; Chest Single View d
ated 8/13/2017; Chest Single View dated 6/14/2017

 

FINDINGS:  Portable technique limits examination quality.

 

The lungs are grossly clear. Tortuous thoracic aorta with the heart size upper limit of normal. Right
 total shoulder arthroplasty.

 

IMPRESSION:  No acute intrathoracic process suspected.

## 2019-03-01 NOTE — EDPHYS
Physician Documentation                                                                           

 [facility]                                                                

Name: Olga Figueroa                                                                                  

Age: 68 yrs                                                                                       

Sex: Female                                                                                       

: 1950                                                                                   

MRN: [id number]                                                                                   

Arrival Date: 2019                                                                          

Time: 18:17                                                                                       

Account#: [id number]                                                                            

Bed 6                                                                                             

Private MD:                                                                                       

ED Physician Lorne Caruso                                                                       

HPI:                                                                                              

                                                                                             

19:40 This 68 yrs old  Female presents to ER via EMS with complaints of Shortness Of jr8 

      Breath.                                                                                     

19:40 The patient has shortness of breath at rest. Onset: The symptoms/episode began/occurred jr8 

      gradually, 2 day(s) ago, and became worse and became persistent. Duration: The symptoms     

      are continuous. The patient's shortness of breath is aggravated by talking. Associated      

      signs and symptoms: The patient has no apparent associated signs or symptoms. Severity      

      of symptoms: At their worst the symptoms were moderate in the emergency department the      

      symptoms have improved. It is unknown whether or not the patient has had similar            

      symptoms in the past. The patient has not recently seen a physician.                        

                                                                                                  

Historical:                                                                                       

- Allergies:                                                                                      

18:32 oxybutynin;                                                                             jl7 

- Home Meds:                                                                                      

18:32 temazepam 15 mg Oral cap 1 cap once daily [Active]; pantoprazole 40 mg Oral TbEC 1 tab  jl7 

      2 times per day [Active]; Triamcinolone Acetonide Topical [Active]; spironolactone 50       

      mg Oral tab 1 tab once daily [Active]; gabapentin 400 mg oral cap 3 times per day           

      [Active]; Cymbalta oral 90 mg oral once daily [Active]; Lasix 20 mg Oral tab 1 tab 3        

      times per day [Active]; alprazolam 1 mg Oral tab 1 tab 3 times per day [Active];            

      Seroquel 100 mg Oral tab 2 tabs daily [Active]; levothyroxine 25 mcg tab 1 tab once         

      daily [Active]; atorvastatin 10 mg oral tab 1 tab once daily [Active]; Myrbetriq 25 mg      

      oral Tb24 1 tab once daily [Active]; fluticasone 50 mcg/actuation nasal spsn 1 spray 2      

      times per day [Active]; cetirizine 10 mg Oral chew 1 tab once daily [Active]; aspirin       

      81 mg Oral TbEC 1 tab once daily [Active]; prednisone 10 mg Oral tab once daily             

      [Active]; ipratropium-albuterol 0.5 mg-3 mg(2.5 mg base)/3 mL Inhl nebu 3 mL 4 times        

      per day [Active]; tramadol 50 mg Oral tab 1 tab every 6 hours for Pain [Active];            

      hydroxyzine HCl 25 mg Oral tab 1 tab 4 times per day [Active]; simethicone 80 mg Oral       

      chew every 8 hours [Active];                                                                

- PMHx:                                                                                           

18:32 Anxiety; cognitive communication deficit; Cellulitis; constipation; COPD; HEART         jl7 

      FAILURE; Hypertension; Hypothyroidism; PERIPHERAL NEUROPATHY; Bipolar disorder; Anemia;     

      Depression; Emphysema; Dementia;                                                            

- PSHx:                                                                                           

18:32 left Hip;                                                                               jl7 

                                                                                                  

- Immunization history:: Adult Immunizations unknown.                                             

- Social history:: Smoking status: unknown.                                                       

- Ebola Screening: : No symptoms or risks identified at this time.                                

                                                                                                  

                                                                                                  

ROS:                                                                                              

19:40 Eyes: Negative for injury, pain, redness, and discharge, ENT: Negative for injury,      jr8 

      pain, and discharge, Neck: Negative for injury, pain, and swelling, Cardiovascular:         

      Negative for chest pain, palpitations, and edema, Abdomen/GI: Negative for abdominal        

      pain, nausea, vomiting, diarrhea, and constipation, Back: Negative for injury and pain,     

      MS/Extremity: Negative for injury and deformity, Skin: Negative for injury, rash, and       

      discoloration, Neuro: Negative for headache, weakness, numbness, tingling, and seizure.     

19:40 Respiratory: Positive for cough, shortness of breath, wheezing.                             

                                                                                                  

Exam:                                                                                             

19:40 Eyes:  Pupils equal round and reactive to light, extra-ocular motions intact.  Lids and jr8 

      lashes normal.  Conjunctiva and sclera are non-icteric and not injected.  Cornea within     

      normal limits.  Periorbital areas with no swelling, redness, or edema. ENT:  Nares          

      patent. No nasal discharge, no septal abnormalities noted.  Tympanic membranes are          

      normal and external auditory canals are clear.  Oropharynx with no redness, swelling,       

      or masses, exudates, or evidence of obstruction, uvula midline.  Mucous membranes           

      moist. Neck:  Trachea midline, no thyromegaly or masses palpated, and no cervical           

      lymphadenopathy.  Supple, full range of motion without nuchal rigidity, or vertebral        

      point tenderness.  No Meningismus. Cardiovascular:  Regular rate and rhythm with a          

      normal S1 and S2.  No gallops, murmurs, or rubs.  Normal PMI, no JVD.  No pulse             

      deficits. Abdomen/GI:  Soft, non-tender, with normal bowel sounds.  No distension or        

      tympany.  No guarding or rebound.  No evidence of tenderness throughout. Back:  No          

      spinal tenderness.  No costovertebral tenderness.  Full range of motion. Skin:  Warm,       

      dry with normal turgor.  Normal color with no rashes, no lesions, and no evidence of        

      cellulitis. MS/ Extremity:  Pulses equal, no cyanosis.  Neurovascular intact.  Full,        

      normal range of motion. Neuro:  Awake and alert, GCS 15, oriented to person, place,         

      time, and situation.  Cranial nerves II-XII grossly intact.  Motor strength 5/5 in all      

      extremities.  Sensory grossly intact.  Cerebellar exam normal.  Normal gait.                

19:40 Respiratory: the patient does not display signs of respiratory distress,  Respirations:     

      normal, Breath sounds: rhonchi, that are moderate, are heard diffusely.                     

                                                                                                  

Vital Signs:                                                                                      

18:32  / 75; Pulse 82; Resp 14 S; Pulse Ox 98% on Nebulizer Mask; Pain 8/10;            jl7 

18:45 Pulse 89; Resp 17 S; Temp 98.5; Pulse Ox 87% on R/A;                                    jl7 

18:47 Pulse 86; Resp 16 S; Pulse Ox 92% on 2 lpm NC;                                          jl7 

19:50  / 69; Pulse 96; Resp 22; Pulse Ox 94% on 2 lpm NC;                               tl2 

20:15  / 69; Pulse 80; Resp 21 S; Pulse Ox 96% 2 lpm ;                                  ea  

21:47  / 63; Pulse 89; Resp 22; Pulse Ox 96% on 2 lpm NC;                               ea  

22:32  / 68; Pulse 88; Resp 19; Pulse Ox 96% on 2 lpm NC;                               ea  

                                                                                                  

MDM:                                                                                              

18:18 Patient medically screened.                                                             jr8 

20:47 Data reviewed: vital signs, nurses notes, lab test result(s), EKG, radiologic studies,  jr8 

      plain films. Data interpreted: Pulse oximetry: on room air is 87 %. Interpretation:         

      hypoxia. Counseling: I had a detailed discussion with the patient and/or guardian           

      regarding: the historical points, exam findings, and any diagnostic results supporting      

      the discharge/admit diagnosis, lab results, radiology results, the need for further         

      work-up and treatment in the hospital.                                                      

                                                                                                  

                                                                                             

18:19 Order name: Basic Metabolic Panel; Complete Time: 19:21                                 jr8 

                                                                                             

18:19 Order name: CBC with Diff; Complete Time: 21:31                                         jr8 

                                                                                             

18:19 Order name: LFT's; Complete Time: 19:21                                                 jr8 

                                                                                             

18:19 Order name: Magnesium; Complete Time: 19:21                                             jr8 

                                                                                             

18:19 Order name: NT PRO-BNP; Complete Time: 19:21                                            jr8 

                                                                                             

18:19 Order name: PT-INR; Complete Time: 19:21                                                8 

                                                                                             

18:19 Order name: Troponin (emerg Dept Use Only); Complete Time: 19:21                        8 

                                                                                             

18:37 Order name: Blood Culture Adult (2)                                                     8 

                                                                                             

18:37 Order name: Procalcitonin; Complete Time: 19:21                                         8 

                                                                                             

18:37 Order name: Lactate; Complete Time: 19:21                                               8 

                                                                                             

20:47 Order name: Urine Microscopic Only; Complete Time: 22:05                                8 

                                                                                             

21:30 Order name: CBC Smear Scan; Complete Time: 21:31                                        EDMS

                                                                                             

21:42 Order name: Urine Dipstick--Ancillary (enter results)                                   2 

                                                                                             

21:45 Order name: Urine Dipstick-Ancillary; Complete Time: 22:05                              EDMS

                                                                                             

18:19 Order name: XRAY Chest (1 view); Complete Time: 19:26                                   8 

                                                                                             

18:19 Order name: EKG; Complete Time: 18:21                                                   8 

                                                                                             

18:19 Order name: Cardiac monitoring; Complete Time: 18:36                                    8 

                                                                                             

18:19 Order name: EKG - Nurse/Tech; Complete Time: 18:36                                      8 

                                                                                             

18:19 Order name: IV Saline Lock; Complete Time: 18:36                                        8 

                                                                                             

18:19 Order name: Labs collected and sent; Complete Time: 19:10                               8 

                                                                                             

18:19 Order name: O2 Per Protocol; Complete Time: 18:36                                       8 

                                                                                             

18:19 Order name: O2 Sat Monitoring; Complete Time: 18:36                                     8 

                                                                                             

20:47 Order name: Urine Dipstick-Ancillary (obtain specimen); Complete Time: 21:33            jr8 

                                                                                             

21:33 Order name: Early; Complete Time: 21:33                                                 tl2 

                                                                                             

21:50 Order name: CONS Pharmacy Consult                                                       EDMS

                                                                                             

21:50 Order name: NPO                                                                         EDMS

                                                                                                  

Administered Medications:                                                                         

21:47 Drug: Lasix 20 mg Route: IVP; Site: right hand;                                         ea  

22:25 Follow up: Response: No adverse reaction                                                ea  

                                                                                                  

                                                                                                  

Disposition:                                                                                      

19 20:48 Hospitalization ordered by Mahogany Gan for Observation. Preliminary             

  diagnosis is Chronic obstructive pulmonary disease with (acute) exacerbation.                   

- Bed requested for Telemetry/MedSurg (observation).                                              

- Status is Observation.                                                                      ea  

- Condition is Stable.                                                                            

- Problem is new.                                                                                 

- Symptoms have improved.                                                                         

UTI on Admission? No                                                                              

                                                                                                  

                                                                                                  

                                                                                                  

Addendum:                                                                                         

2019                                                                                        

     11:33 Co-signature as Attending Physician, Lorne Caruso MD I agree with the assessment and   k
dr

           plan of care.                                                                          

                                                                                                  

Signatures:                                                                                       

Dispatcher [employer]                           EDMS                                                 

Lorne Caruso MD MD kdr Chretien, Felicia, RN                   RN                                                      

Abundio King PA PA   jr8                                                  

Vickie Blanco RN                        RN   tl2                                                  

Tonya Archer RN                        RN   jl7                                                  

Chrissie Myers RN RN   ea                                                   

                                                                                                  

Corrections: (The following items were deleted from the chart)                                    

                                                                                             

21:33 20:47 Early ordered. jr8                                                                tl2 

22:07 20:48 Hospitalization Ordered by Mahogany Gan MD for Observation. Preliminary          fc  

      diagnosis is Chronic obstructive pulmonary disease with (acute) exacerbation. Bed           

      requested for Telemetry/MedSurg (observation). Status is Observation. Condition is          

      Stable. Problem is new. Symptoms have improved. UTI on Admission? No. jr8                   

23:03 22:07 2019 20:48 Hospitalization Ordered by Mahogany Gan MD for Observation.     ea  

      Preliminary diagnosis is Chronic obstructive pulmonary disease with (acute)                 

      exacerbation. Bed requested for Telemetry/MedSurg (observation). Status is Observation.     

      Condition is Stable. Problem is new. Symptoms have improved. UTI on Admission? No. fc       

                                                                                                  

**************************************************************************************************

## 2019-03-02 LAB
ALBUMIN SERPL BCP-MCNC: 3.6 G/DL (ref 3.4–5)
ALP SERPL-CCNC: 100 U/L (ref 45–117)
ALT SERPL W P-5'-P-CCNC: 25 U/L (ref 12–78)
AST SERPL W P-5'-P-CCNC: 14 U/L (ref 15–37)
BUN BLD-MCNC: 18 MG/DL (ref 7–18)
GLUCOSE SERPLBLD-MCNC: 163 MG/DL (ref 74–106)
HCT VFR BLD CALC: 40 % (ref 36–45)
LYMPHOCYTES # SPEC AUTO: 0.8 K/UL (ref 0.7–4.9)
PMV BLD: 9.1 FL (ref 7.6–11.3)
POTASSIUM SERPL-SCNC: 3.8 MMOL/L (ref 3.5–5.1)
RBC # BLD: 4.31 M/UL (ref 3.86–4.86)

## 2019-03-02 RX ADMIN — GABAPENTIN SCH MG: 400 CAPSULE ORAL at 17:39

## 2019-03-02 RX ADMIN — SPIRONOLACTONE SCH MG: 25 TABLET, FILM COATED ORAL at 09:55

## 2019-03-02 RX ADMIN — IRON SUPPLEMENT SCH MG: 325 TABLET ORAL at 20:35

## 2019-03-02 RX ADMIN — Medication SCH: at 09:00

## 2019-03-02 RX ADMIN — Medication SCH MG: at 09:56

## 2019-03-02 RX ADMIN — DULOXETINE HYDROCHLORIDE SCH MG: 30 CAPSULE, DELAYED RELEASE ORAL at 09:56

## 2019-03-02 RX ADMIN — METHYLPREDNISOLONE SODIUM SUCCINATE SCH MG: 125 INJECTION, POWDER, FOR SOLUTION INTRAMUSCULAR; INTRAVENOUS at 18:32

## 2019-03-02 RX ADMIN — IRON SUPPLEMENT SCH MG: 325 TABLET ORAL at 09:56

## 2019-03-02 RX ADMIN — IPRATROPIUM BROMIDE SCH MG: 0.5 SOLUTION RESPIRATORY (INHALATION) at 20:00

## 2019-03-02 RX ADMIN — ASPIRIN SCH MG: 81 TABLET, COATED ORAL at 09:57

## 2019-03-02 RX ADMIN — IPRATROPIUM BROMIDE SCH MG: 0.5 SOLUTION RESPIRATORY (INHALATION) at 07:50

## 2019-03-02 RX ADMIN — QUETIAPINE FUMARATE SCH MG: 100 TABLET, FILM COATED ORAL at 20:34

## 2019-03-02 RX ADMIN — TIOTROPIUM BROMIDE SCH INH: 18 CAPSULE ORAL; RESPIRATORY (INHALATION) at 12:14

## 2019-03-02 RX ADMIN — LEVALBUTEROL HYDROCHLORIDE SCH MG: 1.25 SOLUTION RESPIRATORY (INHALATION) at 20:00

## 2019-03-02 RX ADMIN — LEVALBUTEROL HYDROCHLORIDE SCH MG: 1.25 SOLUTION RESPIRATORY (INHALATION) at 02:00

## 2019-03-02 RX ADMIN — METHYLPREDNISOLONE SODIUM SUCCINATE SCH MG: 125 INJECTION, POWDER, FOR SOLUTION INTRAMUSCULAR; INTRAVENOUS at 12:00

## 2019-03-02 RX ADMIN — LEVALBUTEROL HYDROCHLORIDE SCH MG: 1.25 SOLUTION RESPIRATORY (INHALATION) at 15:05

## 2019-03-02 RX ADMIN — CETIRIZINE HYDROCHLORIDE SCH MG: 5 TABLET, FILM COATED ORAL at 09:56

## 2019-03-02 RX ADMIN — MORPHINE SULFATE PRN MG: 2 INJECTION, SOLUTION INTRAMUSCULAR; INTRAVENOUS at 10:02

## 2019-03-02 RX ADMIN — DOCUSATE SODIUM SCH MG: 100 CAPSULE, LIQUID FILLED ORAL at 20:33

## 2019-03-02 RX ADMIN — ARFORMOTEROL TARTRATE SCH MCG: 15 SOLUTION RESPIRATORY (INHALATION) at 20:00

## 2019-03-02 RX ADMIN — LACTULOSE SCH GM: 20 SOLUTION ORAL at 09:55

## 2019-03-02 RX ADMIN — LACTULOSE SCH GM: 20 SOLUTION ORAL at 20:33

## 2019-03-02 RX ADMIN — METHYLPREDNISOLONE SODIUM SUCCINATE SCH MG: 125 INJECTION, POWDER, FOR SOLUTION INTRAMUSCULAR; INTRAVENOUS at 05:07

## 2019-03-02 RX ADMIN — IPRATROPIUM BROMIDE SCH MG: 0.5 SOLUTION RESPIRATORY (INHALATION) at 02:00

## 2019-03-02 RX ADMIN — Medication SCH ML: at 09:57

## 2019-03-02 RX ADMIN — ALPRAZOLAM SCH MG: 1 TABLET ORAL at 17:39

## 2019-03-02 RX ADMIN — FUROSEMIDE SCH MG: 40 TABLET ORAL at 20:34

## 2019-03-02 RX ADMIN — Medication SCH ML: at 20:36

## 2019-03-02 RX ADMIN — TRIAMCINOLONE ACETONIDE SCH APPL: 1 CREAM TOPICAL at 12:14

## 2019-03-02 RX ADMIN — ALPRAZOLAM SCH MG: 1 TABLET ORAL at 10:31

## 2019-03-02 RX ADMIN — ATORVASTATIN CALCIUM SCH MG: 10 TABLET, FILM COATED ORAL at 20:35

## 2019-03-02 RX ADMIN — FUROSEMIDE SCH MG: 40 TABLET ORAL at 09:58

## 2019-03-02 RX ADMIN — LEVALBUTEROL HYDROCHLORIDE SCH MG: 1.25 SOLUTION RESPIRATORY (INHALATION) at 07:50

## 2019-03-02 RX ADMIN — PANTOPRAZOLE SODIUM SCH MG: 40 TABLET, DELAYED RELEASE ORAL at 20:34

## 2019-03-02 RX ADMIN — PANTOPRAZOLE SODIUM SCH MG: 40 TABLET, DELAYED RELEASE ORAL at 09:57

## 2019-03-02 RX ADMIN — IPRATROPIUM BROMIDE SCH MG: 0.5 SOLUTION RESPIRATORY (INHALATION) at 15:05

## 2019-03-02 RX ADMIN — DOCUSATE SODIUM SCH MG: 100 CAPSULE, LIQUID FILLED ORAL at 09:55

## 2019-03-02 RX ADMIN — GABAPENTIN SCH MG: 400 CAPSULE ORAL at 09:56

## 2019-03-02 RX ADMIN — TRIAMCINOLONE ACETONIDE SCH APPL: 1 CREAM TOPICAL at 20:35

## 2019-03-02 RX ADMIN — FLUTICASONE PROPIONATE SCH SPRAY: 50 SPRAY, METERED NASAL at 20:35

## 2019-03-02 NOTE — P.HP
Certification for Inpatient


Patient admitted to: Inpatient


With expected LOS: >2 Midnights


Patient will require the following post-hospital care: None


Practitioner: I am a practitioner with admitting privileges, knowledge of 

patient current condition, hospital course, and medical plan of care.


Services: Services provided to patient in accordance with Admission 

requirements found in Title 42 Section 412.3 of the Code of Federal Regulations





Patient History


Date of Service: 03/01/19


Reason for admission: Acute COPD exacerbation


History of Present Illness: 





Patient is a 68-year-old female came to the hospital with difficulty breathing.

  Patient has shortness of breath and wheezing and was hypoxic at the nursing 

home.  She also has some confusion.  She was brought into the hospital for 

evaluation.  In the emergency room she remained dyspneic and confused.  She 

also had a leukocytosis.  Decision was made to admit the patient to the 

hospital for further evaluation.


Allergies





oxybutynin Allergy (Verified 03/02/19 02:12)


 Unknown


Penicillins Allergy (Verified 03/02/19 02:12)


 Itching





Home Medications: 








ALPRAZolam [Xanax] 1 mg PO Q8H 03/02/19 


Acetaminophen with Codeine [Acetaminophen-Cod #3 Tablet] 1 each PO Q8H 03/02/19 


Ascorbic Acid 500 mg PO DAILY 03/02/19 


Aspirin [Aspir-Low] 81 mg PO DAILY 03/02/19 


Atorvastatin Calcium 10 mg PO BEDTIME 03/02/19 


Bisacodyl [Dulcolax] 10 mg RC Q24H PRN 03/02/19 


Cetirizine HCl 10 mg PO DAILY 03/02/19 


Docusate Sodium 100 mg PO BID 03/02/19 


Duloxetine HCl [Cymbalta] 90 mg PO DAILY 03/02/19 


Ferrous Sulfate 325 mg PO BID 03/02/19 


Fluticasone Propionate [Flovent Diskus] 1 spray CHRISTIAN BID 03/02/19 


Furosemide [Lasix] 3 tab PO BID 03/02/19 


Gabapentin [Neurontin] 400 mg PO Q8H 03/02/19 


Guaifenesin/Dextromethorphan [Guaifenesin-Dm Solution] 10 ml PO Q6HP PRN 03/02/ 19 


Hydroxyzine HCl [Atarax] 25 mg PO Q6HP PRN 03/02/19 


Ipratropium/Albuterol Sulfate [Iprat-Albut 0.5-3(2.5) mg/3 ml] 3 ml IH Q6HP PRN 

03/02/19 


Lactulose [Cephulac] 15 ml PO Q12H 03/02/19 


Levothyroxine Sodium 25 mcg PO DAILY 03/02/19 


Mirabegron [Myrbetriq] 25 mg PO DAILY 03/02/19 


Pantoprazole Sodium [Protonix] 20 mg PO BID 03/02/19 


Perforomist Neb Solution  1 vial IH Q12H 03/02/19 


Potassium Chloride [K-Dur] 2 tab PO Q8H 03/02/19 


Quetiapine Fumarate [Seroquel] 2 tab PO BEDTIME 03/02/19 


Simethicone [Mylicon Tab] 80 mg PO Q8HP PRN 03/02/19 


Spironolactone 50 mg PO DAILY 03/02/19 


Tears Naturale Ll Solution 2 drop EACH EYE Q12HP PRN 03/02/19 


Temazepam [Restoril] 15 mg PO BEDTIME PRN 03/02/19 


Tiotropium [Spiriva Handihaler] 18 mcg IH DAILY 03/02/19 


Tramadol HCl [Ultram] 50 mg PO Q8H PRN 03/02/19 


Triamcinolone 0.1% Crm [Kenalog 0.1% Cream] 1 appl TOP BID 03/02/19 


predniSONE [Deltasone] 10 mg PO DAILY 03/02/19 








- Past Medical/Surgical History


Has patient received pneumonia vaccine in the past: Yes


Diabetic: No


-: Cellulitis


-: Terminal COPD


-: Hypertension


-: Hypothyroidism


-: Depression


-: Dementia


-: Lipids


-: muscle atrophy


-: Decubitus To sacrum


-: UTI


-: Diastolic CHF


-: Uropathy


-: Hip Surgery





- Family History


  ** Father


Family History: Reviewed- Non-Contributory





- Social History


Smoking Status: Former smoker


Alcohol use: No


CD- Drugs: Yes


Caffeine use: No


Place of Residence: Nursing Home





Review of Systems


10-point ROS is otherwise unremarkable





Physical Examination





- Vital Signs


Temperature: 99.1 F


Blood Pressure: 143/63


Pulse: 81


Respirations: 18


Pulse Ox (%): 98





- Physical Exam


General: Alert, In no apparent distress, Oriented x3


HEENT: Atraumatic, PERRLA, Mucous membr. moist/pink, EOMI, Sclerae nonicteric


Neck: Supple, 2+ carotid pulse no bruit, No LAD, Without JVD or thyroid 

abnormality


Respiratory: Diminished, Expiratory wheezes


Cardiovascular: Regular rate/rhythm, Normal S1 S2, No murmurs


Gastrointestinal: Normal bowel sounds, Soft and benign, Non-distended, No 

tenderness


Musculoskeletal: No clubbing, No swelling, No tenderness


Integumentary: No rashes


Neurological: Normal gait, Normal speech, Normal strength at 5/5 x4 extr, 

Normal tone, Sensation intact, Cranial nerves 3-12 intact, Normal affect


Lymphatics: No axilla or inguinal lymphadenopathy





- Studies


Laboratory Data (last 24 hrs)





03/01/19 18:30: PT 11.8, INR 1.00


03/01/19 18:30: WBC 15.0 H D, Hgb 13.8, Hct 43.2, Plt Count 310


03/01/19 18:30: Sodium 139, Potassium 4.7, BUN 16, Creatinine 1.19, Glucose 171 

H, Magnesium 2.4, Total Bilirubin 0.6, AST 13 L, ALT 27, Alkaline Phosphatase 

114








Assessment & Plan





- Problems (Diagnosis)


(1) Acute exacerbation of chronic obstructive pulmonary disease (COPD)


Onset Date: 01/30/17   Current Visit: No   Status: Acute   





(2) Altered mental status


Onset Date: 02/22/17   Current Visit: No   Status: Acute   





(3) Pneumonia


Onset Date: 09/08/16   Current Visit: No   Status: Acute   


Qualifiers: 


 





(4) SOB (shortness of breath)


Onset Date: 01/04/17   Current Visit: No   Status: Acute   





(5) Depression


Onset Date: 02/12/18   Current Visit: No   Status: Chronic   


Qualifiers: 


 





(6) Hyperlipidemia


Onset Date: 02/12/18   Current Visit: No   Status: Chronic   


Qualifiers: 


 





(7) Hypothyroid


Onset Date: 01/30/17   Current Visit: No   Status: Chronic   


Qualifiers: 


 





(8) Obesity


Onset Date: 02/12/18   Current Visit: No   Status: Chronic   


Qualifiers: 


 





- Plan





Plan:


1. Continue with albuterol and Atrovent nebs


2. Continue with IV steroids


3. She will need to wean off the steroids outpatient and may need a different 

treatment as she has significant additionally appearance.


4. Pulmonary follow-up as an outpatient as patient has been on chronic steroids 

and has become cushingoid and swollen diffusely; shows a pulmonologist in 

[place] that she follows up with regularly.


5. Room air O2 sats


6. Repeat chest x-ray in the morning


7. Monitor fluid status closely.  Patient may have a degree of diastolic 

dysfunction with fluid retention that she has gained a significant amount 

weight over the last few years because of the steroids


8. GI and DVT prophylaxis


Discharge Plan: Home


Plan to discharge in: Greater than 2 days





- Advance Directives


Does patient have a Living Will: No


Does patient have a Durable POA for Healthcare: No





- Code Status/Comfort Care


Code Status Assessed: Yes


Code Status: Full Code


Critical Care: No


Time Spent Managing PTS Care (In Minutes): 45

## 2019-03-02 NOTE — P.PN
Subjective


Date of Service: 03/02/19


Chief Complaint: Acute COPD exacerbation





Review of Systems


10-point ROS is otherwise unremarkable





Physical Examination





- Vital Signs


Temperature: 99.1 F


Blood Pressure: 130/80


Pulse: 83


Respirations: 18


Pulse Ox (%): 98





- Physical Exam


General: In no apparent distress, Mild distress, Obese


HEENT: Atraumatic, PERRLA, EOMI


Neck: Supple, JVD not distended


Respiratory: Normal air movement, Expiratory wheezes, Inspiratory wheezes


Cardiovascular: Regular rate/rhythm, Normal S1 S2


Gastrointestinal: Normal bowel sounds, No tenderness


Musculoskeletal: Other (Frail)


Integumentary: Rash(es), Skin breakdown


Neurological: Normal speech, Normal tone, Normal affect


Lymphatics: No axilla or inguinal lymphadenopathy





- Studies


Laboratory Data (last 24 hrs)





03/01/19 18:30: PT 11.8, INR 1.00


03/01/19 18:30: WBC 15.0 H D, Hgb 13.8, Hct 43.2, Plt Count 310


03/01/19 18:30: Sodium 139, Potassium 4.7, BUN 16, Creatinine 1.19, Glucose 171 

H, Magnesium 2.4, Total Bilirubin 0.6, AST 13 L, ALT 27, Alkaline Phosphatase 

114





Medications List Reviewed: Yes





Assessment And Plan





- Current Problems (Diagnosis)


(1) Acute exacerbation of chronic obstructive pulmonary disease (COPD)


Onset Date: 01/30/17   Current Visit: No   Status: Acute   


Plan: 


Acute on chronic COPD exacerbation


-DuoNeb, steroids, oxygen at this time


-pulmonology consultation.  Awaiting recommendation


-for monitor patient closely here in the hospital








(2) Cushingoid side effect of steroids


Current Visit: Yes   Status: Acute   


Plan: 


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


-patient at this time is started on IV steroids however will need to be 

completely weaned off and see an endocrinologist as outpatient for further 

treatment of her Cushingoid syndrome from chronic steroid use.








(3) CHF (congestive heart failure)


Onset Date: 01/04/17   Current Visit: No   Status: Chronic   


Qualifiers: 


   Qualified Code(s): I50.32 - Chronic diastolic (congestive) heart failure   





(4) Chronic back pain


Onset Date: 08/14/17   Current Visit: No   Status: Chronic   


Qualifiers: 


   Back pain location: back pain in unspecified location   Back pain laterality

: unspecified   Qualified Code(s): M54.9 - Dorsalgia, unspecified   





(5) GERD (gastroesophageal reflux disease)


Onset Date: 02/12/18   Current Visit: No   Status: Chronic   


Qualifiers: 


   Esophagitis presence: esophagitis presence not specified   Qualified Code(s)

: K21.9 - Gastro-esophageal reflux disease without esophagitis   





(6) HTN (hypertension)


Onset Date: 01/30/17   Current Visit: No   Status: Chronic   


Qualifiers: 


   Hypertension type: essential hypertension   Qualified Code(s): I10 - 

Essential (primary) hypertension   





(7) Hyperlipidemia


Onset Date: 02/12/18   Current Visit: No   Status: Chronic   


Qualifiers: 


   Hyperlipidemia type: unspecified 





(8) Hypothyroidism


Onset Date: 02/12/18   Current Visit: No   Status: Chronic   


Qualifiers: 


   Hypothyroidism type: acquired   Qualified Code(s): E03.9 - Hypothyroidism, 

unspecified   


Discharge Plan: Nursing Home


Plan to discharge in: Greater than 2 days





- Code Status/Comfort Care


Code Status Assessed: Yes


Critical Care: No

## 2019-03-03 LAB
BUN BLD-MCNC: 21 MG/DL (ref 7–18)
COHGB MFR BLDA: 1.4 % (ref 0–1.5)
GLUCOSE SERPLBLD-MCNC: 172 MG/DL (ref 74–106)
OXYHGB MFR BLDA: 93.6 % (ref 94–97)
POTASSIUM SERPL-SCNC: 3.7 MMOL/L (ref 3.5–5.1)
SAO2 % BLDA: 95.6 % (ref 92–98.5)

## 2019-03-03 RX ADMIN — METHYLPREDNISOLONE SODIUM SUCCINATE SCH MG: 125 INJECTION, POWDER, FOR SOLUTION INTRAMUSCULAR; INTRAVENOUS at 05:31

## 2019-03-03 RX ADMIN — LEVOTHYROXINE SODIUM SCH MG: 25 TABLET ORAL at 05:32

## 2019-03-03 RX ADMIN — ARFORMOTEROL TARTRATE SCH MCG: 15 SOLUTION RESPIRATORY (INHALATION) at 08:00

## 2019-03-03 RX ADMIN — PANTOPRAZOLE SODIUM SCH MG: 40 TABLET, DELAYED RELEASE ORAL at 09:41

## 2019-03-03 RX ADMIN — Medication SCH MG: at 09:40

## 2019-03-03 RX ADMIN — TRIAMCINOLONE ACETONIDE SCH APPL: 1 CREAM TOPICAL at 09:00

## 2019-03-03 RX ADMIN — LEVALBUTEROL HYDROCHLORIDE SCH MG: 1.25 SOLUTION RESPIRATORY (INHALATION) at 08:00

## 2019-03-03 RX ADMIN — IPRATROPIUM BROMIDE SCH MG: 0.5 SOLUTION RESPIRATORY (INHALATION) at 20:00

## 2019-03-03 RX ADMIN — IPRATROPIUM BROMIDE SCH MG: 0.5 SOLUTION RESPIRATORY (INHALATION) at 14:35

## 2019-03-03 RX ADMIN — GABAPENTIN SCH MG: 400 CAPSULE ORAL at 09:41

## 2019-03-03 RX ADMIN — METHYLPREDNISOLONE SODIUM SUCCINATE SCH MG: 125 INJECTION, POWDER, FOR SOLUTION INTRAMUSCULAR; INTRAVENOUS at 12:00

## 2019-03-03 RX ADMIN — FUROSEMIDE SCH: 40 TABLET ORAL at 09:00

## 2019-03-03 RX ADMIN — FLUTICASONE PROPIONATE SCH SPRAY: 50 SPRAY, METERED NASAL at 21:28

## 2019-03-03 RX ADMIN — CETIRIZINE HYDROCHLORIDE SCH MG: 5 TABLET, FILM COATED ORAL at 09:39

## 2019-03-03 RX ADMIN — METHYLPREDNISOLONE SODIUM SUCCINATE SCH MG: 125 INJECTION, POWDER, FOR SOLUTION INTRAMUSCULAR; INTRAVENOUS at 18:00

## 2019-03-03 RX ADMIN — ARFORMOTEROL TARTRATE SCH MCG: 15 SOLUTION RESPIRATORY (INHALATION) at 20:00

## 2019-03-03 RX ADMIN — IRON SUPPLEMENT SCH MG: 325 TABLET ORAL at 21:26

## 2019-03-03 RX ADMIN — TIOTROPIUM BROMIDE SCH INH: 18 CAPSULE ORAL; RESPIRATORY (INHALATION) at 09:00

## 2019-03-03 RX ADMIN — ATORVASTATIN CALCIUM SCH MG: 10 TABLET, FILM COATED ORAL at 21:27

## 2019-03-03 RX ADMIN — Medication SCH: at 09:00

## 2019-03-03 RX ADMIN — GABAPENTIN SCH MG: 400 CAPSULE ORAL at 01:07

## 2019-03-03 RX ADMIN — QUETIAPINE FUMARATE SCH MG: 100 TABLET, FILM COATED ORAL at 21:27

## 2019-03-03 RX ADMIN — DOCUSATE SODIUM SCH MG: 100 CAPSULE, LIQUID FILLED ORAL at 21:00

## 2019-03-03 RX ADMIN — SPIRONOLACTONE SCH MG: 25 TABLET, FILM COATED ORAL at 09:37

## 2019-03-03 RX ADMIN — ALPRAZOLAM SCH: 1 TABLET ORAL at 09:00

## 2019-03-03 RX ADMIN — DULOXETINE HYDROCHLORIDE SCH MG: 30 CAPSULE, DELAYED RELEASE ORAL at 09:38

## 2019-03-03 RX ADMIN — Medication SCH ML: at 09:00

## 2019-03-03 RX ADMIN — FLUTICASONE PROPIONATE SCH SPRAY: 50 SPRAY, METERED NASAL at 09:00

## 2019-03-03 RX ADMIN — LEVALBUTEROL HYDROCHLORIDE SCH MG: 1.25 SOLUTION RESPIRATORY (INHALATION) at 02:00

## 2019-03-03 RX ADMIN — LACTULOSE SCH GM: 20 SOLUTION ORAL at 21:00

## 2019-03-03 RX ADMIN — ALPRAZOLAM SCH MG: 1 TABLET ORAL at 09:57

## 2019-03-03 RX ADMIN — LACTULOSE SCH GM: 20 SOLUTION ORAL at 09:39

## 2019-03-03 RX ADMIN — LEVALBUTEROL HYDROCHLORIDE SCH MG: 1.25 SOLUTION RESPIRATORY (INHALATION) at 20:00

## 2019-03-03 RX ADMIN — FUROSEMIDE SCH MG: 40 TABLET ORAL at 21:26

## 2019-03-03 RX ADMIN — ASPIRIN SCH MG: 81 TABLET, COATED ORAL at 09:38

## 2019-03-03 RX ADMIN — LEVALBUTEROL HYDROCHLORIDE SCH MG: 1.25 SOLUTION RESPIRATORY (INHALATION) at 14:35

## 2019-03-03 RX ADMIN — ALPRAZOLAM SCH MG: 1 TABLET ORAL at 16:51

## 2019-03-03 RX ADMIN — TRIAMCINOLONE ACETONIDE SCH APPL: 1 CREAM TOPICAL at 21:28

## 2019-03-03 RX ADMIN — Medication SCH ML: at 21:38

## 2019-03-03 RX ADMIN — ALPRAZOLAM SCH MG: 1 TABLET ORAL at 01:07

## 2019-03-03 RX ADMIN — METHYLPREDNISOLONE SODIUM SUCCINATE SCH MG: 125 INJECTION, POWDER, FOR SOLUTION INTRAMUSCULAR; INTRAVENOUS at 01:06

## 2019-03-03 RX ADMIN — IRON SUPPLEMENT SCH MG: 325 TABLET ORAL at 09:38

## 2019-03-03 RX ADMIN — DOCUSATE SODIUM SCH MG: 100 CAPSULE, LIQUID FILLED ORAL at 09:38

## 2019-03-03 RX ADMIN — MORPHINE SULFATE PRN MG: 2 INJECTION, SOLUTION INTRAMUSCULAR; INTRAVENOUS at 22:18

## 2019-03-03 RX ADMIN — GABAPENTIN SCH MG: 400 CAPSULE ORAL at 16:51

## 2019-03-03 RX ADMIN — IPRATROPIUM BROMIDE SCH MG: 0.5 SOLUTION RESPIRATORY (INHALATION) at 02:00

## 2019-03-03 RX ADMIN — IPRATROPIUM BROMIDE SCH MG: 0.5 SOLUTION RESPIRATORY (INHALATION) at 08:00

## 2019-03-03 RX ADMIN — PANTOPRAZOLE SODIUM SCH MG: 40 TABLET, DELAYED RELEASE ORAL at 21:00

## 2019-03-03 NOTE — P.PN
Subjective


Date of Service: 03/03/19


Chief Complaint: Acute COPD exacerbation





Patient seen and examined at this time.  Chart reviewed.  Case discussed with 

pulmonology.  Patient continues to have increased work of breathing this 

morning.  ABGs are pending at this time.





Review of Systems


10-point ROS is otherwise unremarkable





Physical Examination





- Vital Signs


Temperature: 97.7 F


Blood Pressure: 128/58


Pulse: 88


Respirations: 18


Pulse Ox (%): 96





- Physical Exam


General: Alert, In no apparent distress, Other (Cushingoid appearing)


HEENT: Atraumatic, PERRLA, EOMI


Neck: Supple, JVD not distended


Respiratory: Normal air movement, Expiratory wheezes, Inspiratory wheezes


Cardiovascular: Regular rate/rhythm, Normal S1 S2


Gastrointestinal: Normal bowel sounds, No tenderness


Musculoskeletal: Erythema


Integumentary: Rash(es)


Neurological: Normal speech, Normal tone, Normal affect


Lymphatics: No axilla or inguinal lymphadenopathy





- Studies


Medications List Reviewed: Yes





Assessment And Plan





- Current Problems (Diagnosis)


(1) Acute exacerbation of chronic obstructive pulmonary disease (COPD)


Onset Date: 01/30/17   Current Visit: No   Status: Acute   


Plan: 


Acute on chronic hypercapnic respiratory distress secondary to COPD exacerbation


-DuoNeb, steroids, oxygen at this time


-ABGs pending at this time.  Depending on the ABGs will start BiPAP if needed


-pulmonology consultation.  Recommendations appreciated at this


-for monitor patient closely here in the hospital








(2) Cushingoid side effect of steroids


Current Visit: Yes   Status: Acute   


Plan: 


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


-patient at this time is started on IV steroids however will need to be 

completely weaned off and see an endocrinologist as outpatient for further 

treatment of her Cushingoid syndrome from chronic steroid use.








(3) CHF (congestive heart failure)


Onset Date: 01/04/17   Current Visit: No   Status: Chronic   


Qualifiers: 


   Qualified Code(s): I50.32 - Chronic diastolic (congestive) heart failure   





(4) Chronic back pain


Onset Date: 08/14/17   Current Visit: No   Status: Chronic   


Qualifiers: 


   Back pain location: back pain in unspecified location   Back pain laterality

: unspecified   Qualified Code(s): M54.9 - Dorsalgia, unspecified   





(5) GERD (gastroesophageal reflux disease)


Onset Date: 02/12/18   Current Visit: No   Status: Chronic   


Qualifiers: 


   Esophagitis presence: esophagitis presence not specified   Qualified Code(s)

: K21.9 - Gastro-esophageal reflux disease without esophagitis   





(6) HTN (hypertension)


Onset Date: 01/30/17   Current Visit: No   Status: Chronic   


Qualifiers: 


   Hypertension type: essential hypertension   Qualified Code(s): I10 - 

Essential (primary) hypertension   





(7) Hyperlipidemia


Onset Date: 02/12/18   Current Visit: No   Status: Chronic   


Qualifiers: 


   Hyperlipidemia type: unspecified 





(8) Hypothyroidism


Onset Date: 02/12/18   Current Visit: No   Status: Chronic   


Qualifiers: 


   Hypothyroidism type: acquired   Qualified Code(s): E03.9 - Hypothyroidism, 

unspecified   


Discharge Plan: Home


Plan to discharge in: 48 Hours





- Code Status/Comfort Care


Code Status Assessed: Yes


Critical Care: No

## 2019-03-03 NOTE — EKG
Test Date:    2019-03-01               Test Time:    18:30:03

Technician:   CHARLIE                                    

                                                     

MEASUREMENT RESULTS:                                       

Intervals:                                           

Rate:         84                                     

MD:           116                                    

QRSD:         78                                     

QT:           416                                    

QTc:          491                                    

Axis:                                                

P:            67                                     

MD:           116                                    

QRS:          8                                      

T:            84                                     

                                                     

INTERPRETIVE STATEMENTS:                                       

                                                     

Normal sinus rhythm

Right atrial enlargement

Low voltage QRS

Nonspecific ST and T wave abnormality

Prolonged QT

Abnormal ECG

Compared to ECG 09/05/2018 20:21:36

Possible ischemia no longer present

ST (T wave) deviation still present



Electronically Signed On 03-03-19 10:12:49 CST by Elias Barcenas

## 2019-03-04 VITALS — DIASTOLIC BLOOD PRESSURE: 57 MMHG | TEMPERATURE: 97.2 F | SYSTOLIC BLOOD PRESSURE: 111 MMHG

## 2019-03-04 VITALS — OXYGEN SATURATION: 94 %

## 2019-03-04 LAB
BUN BLD-MCNC: 25 MG/DL (ref 7–18)
GLUCOSE SERPLBLD-MCNC: 220 MG/DL (ref 74–106)
POTASSIUM SERPL-SCNC: 3.6 MMOL/L (ref 3.5–5.1)

## 2019-03-04 RX ADMIN — FLUTICASONE PROPIONATE SCH SPRAY: 50 SPRAY, METERED NASAL at 09:41

## 2019-03-04 RX ADMIN — DULOXETINE HYDROCHLORIDE SCH MG: 30 CAPSULE, DELAYED RELEASE ORAL at 09:38

## 2019-03-04 RX ADMIN — METHYLPREDNISOLONE SODIUM SUCCINATE SCH MG: 125 INJECTION, POWDER, FOR SOLUTION INTRAMUSCULAR; INTRAVENOUS at 05:34

## 2019-03-04 RX ADMIN — IRON SUPPLEMENT SCH MG: 325 TABLET ORAL at 09:36

## 2019-03-04 RX ADMIN — ALPRAZOLAM SCH MG: 1 TABLET ORAL at 09:39

## 2019-03-04 RX ADMIN — FUROSEMIDE SCH MG: 40 TABLET ORAL at 09:35

## 2019-03-04 RX ADMIN — LACTULOSE SCH GM: 20 SOLUTION ORAL at 09:34

## 2019-03-04 RX ADMIN — METHYLPREDNISOLONE SODIUM SUCCINATE SCH MG: 125 INJECTION, POWDER, FOR SOLUTION INTRAMUSCULAR; INTRAVENOUS at 00:28

## 2019-03-04 RX ADMIN — MORPHINE SULFATE PRN MG: 2 INJECTION, SOLUTION INTRAMUSCULAR; INTRAVENOUS at 09:54

## 2019-03-04 RX ADMIN — LEVALBUTEROL HYDROCHLORIDE SCH: 1.25 SOLUTION RESPIRATORY (INHALATION) at 02:00

## 2019-03-04 RX ADMIN — LEVOTHYROXINE SODIUM SCH MG: 25 TABLET ORAL at 05:37

## 2019-03-04 RX ADMIN — GABAPENTIN SCH MG: 400 CAPSULE ORAL at 09:34

## 2019-03-04 RX ADMIN — GABAPENTIN SCH MG: 400 CAPSULE ORAL at 00:28

## 2019-03-04 RX ADMIN — METHYLPREDNISOLONE SODIUM SUCCINATE SCH MG: 125 INJECTION, POWDER, FOR SOLUTION INTRAMUSCULAR; INTRAVENOUS at 13:03

## 2019-03-04 RX ADMIN — IPRATROPIUM BROMIDE SCH MG: 0.5 SOLUTION RESPIRATORY (INHALATION) at 07:57

## 2019-03-04 RX ADMIN — Medication SCH: at 09:00

## 2019-03-04 RX ADMIN — SPIRONOLACTONE SCH MG: 25 TABLET, FILM COATED ORAL at 09:37

## 2019-03-04 RX ADMIN — ALPRAZOLAM SCH MG: 1 TABLET ORAL at 00:28

## 2019-03-04 RX ADMIN — Medication SCH ML: at 09:39

## 2019-03-04 RX ADMIN — CETIRIZINE HYDROCHLORIDE SCH MG: 5 TABLET, FILM COATED ORAL at 09:37

## 2019-03-04 RX ADMIN — IPRATROPIUM BROMIDE SCH: 0.5 SOLUTION RESPIRATORY (INHALATION) at 02:00

## 2019-03-04 RX ADMIN — TIOTROPIUM BROMIDE SCH INH: 18 CAPSULE ORAL; RESPIRATORY (INHALATION) at 09:42

## 2019-03-04 RX ADMIN — TRIAMCINOLONE ACETONIDE SCH APPL: 1 CREAM TOPICAL at 09:41

## 2019-03-04 RX ADMIN — ARFORMOTEROL TARTRATE SCH MCG: 15 SOLUTION RESPIRATORY (INHALATION) at 07:57

## 2019-03-04 RX ADMIN — ASPIRIN SCH MG: 81 TABLET, COATED ORAL at 09:35

## 2019-03-04 RX ADMIN — IPRATROPIUM BROMIDE SCH: 0.5 SOLUTION RESPIRATORY (INHALATION) at 14:00

## 2019-03-04 RX ADMIN — LEVALBUTEROL HYDROCHLORIDE SCH: 1.25 SOLUTION RESPIRATORY (INHALATION) at 14:00

## 2019-03-04 RX ADMIN — PANTOPRAZOLE SODIUM SCH MG: 40 TABLET, DELAYED RELEASE ORAL at 09:37

## 2019-03-04 RX ADMIN — LEVALBUTEROL HYDROCHLORIDE SCH MG: 1.25 SOLUTION RESPIRATORY (INHALATION) at 07:57

## 2019-03-04 RX ADMIN — DOCUSATE SODIUM SCH MG: 100 CAPSULE, LIQUID FILLED ORAL at 09:34

## 2019-03-04 RX ADMIN — Medication SCH MG: at 09:36

## 2019-03-04 NOTE — P.DS
Admission Date: 03/01/19


Discharge Date: 03/04/19


Disposition: ROUTINE DISCHARGE


Discharge Condition: GOOD


Reason for Admission: Shaking and shortness of breath





- Problems


(1) Acute exacerbation of chronic obstructive pulmonary disease (COPD)


Onset Date: 01/30/17   Current Visit: No   Status: Acute   





(2) Cushingoid side effect of steroids


Current Visit: Yes   Status: Acute   





(3) CHF (congestive heart failure)


Onset Date: 01/04/17   Current Visit: No   Status: Chronic   


Qualifiers: 


   Qualified Code(s): I50.32 - Chronic diastolic (congestive) heart failure   





(4) Chronic back pain


Onset Date: 08/14/17   Current Visit: No   Status: Chronic   


Qualifiers: 


   Back pain location: back pain in unspecified location   Back pain laterality

: unspecified   Qualified Code(s): M54.9 - Dorsalgia, unspecified   





(5) GERD (gastroesophageal reflux disease)


Onset Date: 02/12/18   Current Visit: No   Status: Chronic   


Qualifiers: 


   Esophagitis presence: esophagitis presence not specified   Qualified Code(s)

: K21.9 - Gastro-esophageal reflux disease without esophagitis   





(6) HTN (hypertension)


Onset Date: 01/30/17   Current Visit: No   Status: Chronic   


Qualifiers: 


   Hypertension type: essential hypertension   Qualified Code(s): I10 - 

Essential (primary) hypertension   





(7) Hyperlipidemia


Onset Date: 02/12/18   Current Visit: No   Status: Chronic   


Qualifiers: 


   Hyperlipidemia type: unspecified 





(8) Hypothyroidism


Onset Date: 02/12/18   Current Visit: No   Status: Chronic   


Qualifiers: 


   Hypothyroidism type: acquired   Qualified Code(s): E03.9 - Hypothyroidism, 

unspecified   


Brief History of Present Illness: 





Patient is a 68-year-old female came to the hospital with difficulty breathing.

  Patient has shortness of breath and wheezing and was hypoxic at the nursing 

home.  She also has some confusion.  She was brought into the hospital for 

evaluation.  In the emergency room she remained dyspneic and confused.  She 

also had a leukocytosis.  Decision was made to admit the patient to the 

hospital for further evaluation.


Hospital Course: 





Overall during the hospital stay patient remained stable





Patient was initially admitted to the hospital for COPD exacerbation most 

likely secondary to bacterial pneumonia.  Patient was started on duo nebs, 

steroids, oxygenation.  ABGs were done which was consistent with mild 

hypercapnia.  Patient however refused to were BiPAP here in the hospital.  

Patient after getting nebulizer treatment and steroids here in the hospital had 

marked improvement in her symptoms.  Pulmonology was consulted who recommended 

the patient can be discharged home with oral antibiotics and follow up with her 

pulmonologist in [place].  Patient was notified about the plan agreeable to 

plan and thus was discharged under stable condition which she was able to 

breathe better.  Patient does use 3 L of nasal cannula at home and was 

continued diet here and on the hospital.  Patient also had cushingoid 

appearance on admission which has worsened since last admission.  Patient thus 

was asked to follow up with an endocrinologist and to get weaned off of 

steroids to help with her cushingoid syndrome.  Patient stated that she will 

make an appointment with endocrinologist in [place] and will follow up with 

them this month.  Patient again was notified to continue taking low-dose 

steroids until she gets seen by endocrinologist.  Patient's inhalers were 

resumed on discharge and was asked to follow up with pulmonologist in about 1-2 

days post discharge.  Patient demonstrated understanding regarding the plan, 

medication compliance and outpatient followup and thus was discharged home 

under stable condition once he was saturating well on 3 L of nasal cannula.


Vital Signs/Physical Exam: 














Temp Pulse Resp BP Pulse Ox


 


 97 F   73   20   131/77   99 


 


 03/04/19 08:00  03/04/19 09:37  03/04/19 08:00  03/04/19 09:37  03/04/19 08:00








General: Alert, In no apparent distress


HEENT: Atraumatic, Other (Patient has cushingoid appearance with swollen face)


Neck: Supple, JVD not distended


Respiratory: Normal air movement, Expiratory wheezes, Inspiratory wheezes


Cardiovascular: Regular rate/rhythm, Normal S1 S2


Gastrointestinal: Normal bowel sounds, No tenderness


Musculoskeletal: No tenderness


Integumentary: No rashes


Neurological: Normal speech, Normal tone, Normal affect


Lymphatics: No axilla or inguinal lymphadenopathy


Laboratory Data at Discharge: 














WBC  13.8 K/uL (4.3-10.9)  H  03/02/19  04:35    


 


Hgb  13.0 g/dL (12.0-15.0)   03/02/19  04:35    


 


Hct  40.0 % (36.0-45.0)   03/02/19  04:35    


 


Plt Count  296 K/uL (152-406)   03/02/19  04:35    


 


PT  11.8 SECONDS (9.5-12.5)   03/01/19  18:30    


 


INR  1.00   03/01/19  18:30    


 


Sodium  140 mmol/L (136-145)   03/04/19  06:00    


 


Potassium  3.6 mmol/L (3.5-5.1)   03/04/19  06:00    


 


BUN  25 mg/dL (7-18)  H  03/04/19  06:00    


 


Creatinine  1.22 mg/dL (0.55-1.3)   03/04/19  06:00    


 


Glucose  220 mg/dL ()  H  03/04/19  06:00    


 


Magnesium  2.4 mg/dL (1.8-2.4)   03/01/19  18:30    


 


Total Bilirubin  0.5 mg/dL (0.2-1.0)   03/02/19  04:35    


 


AST  14 U/L (15-37)  L  03/02/19  04:35    


 


ALT  25 U/L (12-78)   03/02/19  04:35    


 


Alkaline Phosphatase  100 U/L ()   03/02/19  04:35    








Home Medications: 








ALPRAZolam [Xanax*] 1 mg PO Q8H 03/02/19 


Acetaminophen with Codeine [Acetaminophen-Cod #3 Tablet] 1 each PO Q8H 03/02/19 


Ascorbic Acid 500 mg PO DAILY 03/02/19 


Aspirin [Aspir-Low] 81 mg PO DAILY 03/02/19 


Atorvastatin Calcium 10 mg PO BEDTIME 03/02/19 


Bisacodyl [Dulcolax*] 10 mg RC Q24H PRN 03/02/19 


Cetirizine HCl 10 mg PO DAILY 03/02/19 


Docusate Sodium 100 mg PO BID 03/02/19 


Duloxetine HCl [Cymbalta] 90 mg PO DAILY 03/02/19 


Ferrous Sulfate 325 mg PO BID 03/02/19 


Fluticasone Propionate [Flovent Diskus] 1 spray CHRISTIAN BID 03/02/19 


Furosemide [Lasix*] 3 tab PO BID 03/02/19 


Gabapentin [Neurontin*] 400 mg PO Q8H 03/02/19 


Guaifenesin/Dextromethorphan [Guaifenesin-Dm Solution] 10 ml PO Q6HP PRN 03/02/ 19 


Hydroxyzine HCl [Atarax] 25 mg PO Q6HP PRN 03/02/19 


Ipratropium/Albuterol Sulfate [Iprat-Albut 0.5-3(2.5) mg/3 ml] 3 ml IH Q6HP PRN 

03/02/19 


Lactulose [Cephulac*] 15 ml PO Q12H 03/02/19 


Levothyroxine Sodium 25 mcg PO DAILY 03/02/19 


Mirabegron [Myrbetriq] 25 mg PO DAILY 03/02/19 


Pantoprazole Sodium [Protonix] 20 mg PO BID 03/02/19 


Perforomist Neb Solution  1 vial IH Q12H 03/02/19 


Potassium Chloride [K-Dur] 2 tab PO Q8H 03/02/19 


Quetiapine Fumarate [Seroquel] 2 tab PO BEDTIME 03/02/19 


Simethicone [Mylicon*] 80 mg PO Q8HP PRN 03/02/19 


Spironolactone 50 mg PO DAILY 03/02/19 


Tears Naturale Ll Solution 2 drop EACH EYE Q12HP PRN 03/02/19 


Temazepam [Restoril*] 15 mg PO BEDTIME PRN 03/02/19 


Tiotropium [Spiriva Handihaler*] 18 mcg IH DAILY 03/02/19 


Tramadol HCl [Ultram] 50 mg PO Q8H PRN 03/02/19 


Triamcinolone 0.1% Crm [Kenalog 0.1% Cream*] 1 appl TOP BID 03/02/19 


predniSONE [Deltasone*] 10 mg PO DAILY 03/02/19 


levoFLOXacin [Levaquin] 500 mg PO DAILY #7 tab 03/04/19 





New Medications: 


levoFLOXacin [Levaquin] 500 mg PO DAILY #7 tab


Patient Discharge Instructions: Please followup with the primary care doctor 

along with pulmonologist in about 2 weeks post discharge.  New medication.  

Levaquin 500 mg for 7 day.  He will need to follow up with an endocrinologist 

outpatient as well for your question ovoid syndrome due to chronic steroid use.


Diet: Regular


Activity: Ad rubi

## 2019-03-04 NOTE — P.CNS
Date of Consult: 03/04/19


Chief Complaint: Shaking and shortness of breath


History of Present Illness: 





Patient is 68 years of age with a history off COPD is debilitated admitted with 

shaking spells for quite some time denies any cough has some shortness of 

breath feeling better  she appears to be very cushingnoid patient also 

complained of some shortness of breath he has been doing better since admission


Allergies





oxybutynin Allergy (Verified 03/02/19 02:12)


 Unknown


Penicillins Allergy (Verified 03/02/19 02:12)


 Itching





Home Medications: 








ALPRAZolam [Xanax] 1 mg PO Q8H 03/02/19 


Acetaminophen with Codeine [Acetaminophen-Cod #3 Tablet] 1 each PO Q8H 03/02/19 


Ascorbic Acid 500 mg PO DAILY 03/02/19 


Aspirin [Aspir-Low] 81 mg PO DAILY 03/02/19 


Atorvastatin Calcium 10 mg PO BEDTIME 03/02/19 


Bisacodyl [Dulcolax] 10 mg RC Q24H PRN 03/02/19 


Cetirizine HCl 10 mg PO DAILY 03/02/19 


Docusate Sodium 100 mg PO BID 03/02/19 


Duloxetine HCl [Cymbalta] 90 mg PO DAILY 03/02/19 


Ferrous Sulfate 325 mg PO BID 03/02/19 


Fluticasone Propionate [Flovent Diskus] 1 spray CHRISTIAN BID 03/02/19 


Furosemide [Lasix] 3 tab PO BID 03/02/19 


Gabapentin [Neurontin] 400 mg PO Q8H 03/02/19 


Guaifenesin/Dextromethorphan [Guaifenesin-Dm Solution] 10 ml PO Q6HP PRN 03/02/ 19 


Hydroxyzine HCl [Atarax] 25 mg PO Q6HP PRN 03/02/19 


Ipratropium/Albuterol Sulfate [Iprat-Albut 0.5-3(2.5) mg/3 ml] 3 ml IH Q6HP PRN 

03/02/19 


Lactulose [Cephulac] 15 ml PO Q12H 03/02/19 


Levothyroxine Sodium 25 mcg PO DAILY 03/02/19 


Mirabegron [Myrbetriq] 25 mg PO DAILY 03/02/19 


Pantoprazole Sodium [Protonix] 20 mg PO BID 03/02/19 


Perforomist Neb Solution  1 vial IH Q12H 03/02/19 


Potassium Chloride [K-Dur] 2 tab PO Q8H 03/02/19 


Quetiapine Fumarate [Seroquel] 2 tab PO BEDTIME 03/02/19 


Simethicone [Mylicon Tab] 80 mg PO Q8HP PRN 03/02/19 


Spironolactone 50 mg PO DAILY 03/02/19 


Tears Naturale Ll Solution 2 drop EACH EYE Q12HP PRN 03/02/19 


Temazepam [Restoril] 15 mg PO BEDTIME PRN 03/02/19 


Tiotropium [Spiriva Handihaler] 18 mcg IH DAILY 03/02/19 


Tramadol HCl [Ultram] 50 mg PO Q8H PRN 03/02/19 


Triamcinolone 0.1% Crm [Kenalog 0.1% Cream] 1 appl TOP BID 03/02/19 


predniSONE [Deltasone] 10 mg PO DAILY 03/02/19 








- Past Medical/Surgical History


Diabetic: No


-: Cellulitis


-: Terminal COPD


-: Hypertension


-: Hypothyroidism


-: Depression


-: Dementia


-: Lipids


-: muscle atrophy


-: Decubitus To sacrum


-: UTI


-: Diastolic CHF


-: Uropathy


-: Hip Surgery





- Family History


  ** Father


Family History: Reviewed- Non-Contributory





- Social History


Smoking Status: Unknown if ever smoked


Alcohol use: No


CD- Drugs: Yes


Caffeine use: No


Place of Residence: Nursing Home





Review of Systems


General: Weakness


Respiratory: Shortness of Breath





Physical Examination














Temp Pulse Resp BP Pulse Ox


 


 97.0 F   78   20   130/68   95 


 


 03/04/19 04:00  03/04/19 04:00  03/04/19 04:00  03/04/19 04:00  03/04/19 04:00








General: Alert, In no apparent distress, Oriented x3


HEENT: Atraumatic


Neck: Supple


Respiratory: Clear to auscultation bilaterally, Diminished


Cardiovascular: Edema


Gastrointestinal: Normal bowel sounds, Soft and benign





- Problems


(1) COPD exacerbation


Onset Date: 05/25/16   Current Visit: No   Status: Acute   


Plan: 


Patient is 68 years of age with a history of COPD ocean 0 8 obesity admitted 

with shaking spells she appears to be stable no evidence of sepsis mildly 

elevated white count mildly hypercapnic chest x-ray no obvious infection 

patient has oxygen at home and can be discharged is possible that she may have 

sepsis discharged home with some levofloxacin follow up with her own 

pulmonologist

## 2019-07-05 ENCOUNTER — HOSPITAL ENCOUNTER (INPATIENT)
Dept: HOSPITAL 97 - ER | Age: 69
LOS: 3 days | Discharge: NURSING, REHAB OR LONG-TERM CARE | DRG: 208 | End: 2019-07-08
Attending: FAMILY MEDICINE | Admitting: HOSPITALIST
Payer: COMMERCIAL

## 2019-07-05 VITALS — BODY MASS INDEX: 34 KG/M2

## 2019-07-05 DIAGNOSIS — E24.2: ICD-10-CM

## 2019-07-05 DIAGNOSIS — E66.9: ICD-10-CM

## 2019-07-05 DIAGNOSIS — J14: ICD-10-CM

## 2019-07-05 DIAGNOSIS — Z87.891: ICD-10-CM

## 2019-07-05 DIAGNOSIS — J96.21: ICD-10-CM

## 2019-07-05 DIAGNOSIS — T38.0X5A: ICD-10-CM

## 2019-07-05 DIAGNOSIS — L89.893: ICD-10-CM

## 2019-07-05 DIAGNOSIS — Z88.0: ICD-10-CM

## 2019-07-05 DIAGNOSIS — G30.0: ICD-10-CM

## 2019-07-05 DIAGNOSIS — J44.0: ICD-10-CM

## 2019-07-05 DIAGNOSIS — J96.22: Primary | ICD-10-CM

## 2019-07-05 DIAGNOSIS — Z74.01: ICD-10-CM

## 2019-07-05 DIAGNOSIS — F02.80: ICD-10-CM

## 2019-07-05 DIAGNOSIS — I11.0: ICD-10-CM

## 2019-07-05 DIAGNOSIS — E03.9: ICD-10-CM

## 2019-07-05 DIAGNOSIS — B95.62: ICD-10-CM

## 2019-07-05 DIAGNOSIS — F32.9: ICD-10-CM

## 2019-07-05 DIAGNOSIS — G93.41: ICD-10-CM

## 2019-07-05 DIAGNOSIS — J15.6: ICD-10-CM

## 2019-07-05 DIAGNOSIS — J15.212: ICD-10-CM

## 2019-07-05 DIAGNOSIS — Z88.5: ICD-10-CM

## 2019-07-05 DIAGNOSIS — J44.1: ICD-10-CM

## 2019-07-05 DIAGNOSIS — I50.32: ICD-10-CM

## 2019-07-05 LAB
ALBUMIN SERPL BCP-MCNC: 3.1 G/DL (ref 3.4–5)
ALP SERPL-CCNC: 140 U/L (ref 45–117)
ALT SERPL W P-5'-P-CCNC: 16 U/L (ref 12–78)
AST SERPL W P-5'-P-CCNC: 13 U/L (ref 15–37)
BLD SMEAR INTERP: (no result)
BUN BLD-MCNC: 12 MG/DL (ref 7–18)
COHGB MFR BLDA: 1.3 % (ref 0–1.5)
GLUCOSE SERPLBLD-MCNC: 120 MG/DL (ref 74–106)
HCT VFR BLD CALC: 42.4 % (ref 36–45)
INR BLD: 0.96
LIPASE SERPL-CCNC: 31 U/L (ref 73–393)
LYMPHOCYTES # SPEC AUTO: 1.1 K/UL (ref 0.7–4.9)
MORPHOLOGY BLD-IMP: (no result)
OXYHGB MFR BLDA: 97.1 % (ref 94–97)
PMV BLD: 8.5 FL (ref 7.6–11.3)
POTASSIUM SERPL-SCNC: 5.1 MMOL/L (ref 3.5–5.1)
RBC # BLD: 4.47 M/UL (ref 3.86–4.86)
SAO2 % BLDA: 99.2 % (ref 92–98.5)
TROPONIN (EMERG DEPT USE ONLY): < 0.02 NG/ML (ref 0–0.04)
UA COMPLETE W REFLEX CULTURE PNL UR: (no result)

## 2019-07-05 PROCEDURE — 93005 ELECTROCARDIOGRAM TRACING: CPT

## 2019-07-05 PROCEDURE — 82805 BLOOD GASES W/O2 SATURATION: CPT

## 2019-07-05 PROCEDURE — 87070 CULTURE OTHR SPECIMN AEROBIC: CPT

## 2019-07-05 PROCEDURE — 70450 CT HEAD/BRAIN W/O DYE: CPT

## 2019-07-05 PROCEDURE — 82962 GLUCOSE BLOOD TEST: CPT

## 2019-07-05 PROCEDURE — 5A1945Z RESPIRATORY VENTILATION, 24-96 CONSECUTIVE HOURS: ICD-10-PCS

## 2019-07-05 PROCEDURE — 85610 PROTHROMBIN TIME: CPT

## 2019-07-05 PROCEDURE — 80076 HEPATIC FUNCTION PANEL: CPT

## 2019-07-05 PROCEDURE — 80048 BASIC METABOLIC PNL TOTAL CA: CPT

## 2019-07-05 PROCEDURE — 0BH17EZ INSERTION OF ENDOTRACHEAL AIRWAY INTO TRACHEA, VIA NATURAL OR ARTIFICIAL OPENING: ICD-10-PCS

## 2019-07-05 PROCEDURE — 36415 COLL VENOUS BLD VENIPUNCTURE: CPT

## 2019-07-05 PROCEDURE — 94002 VENT MGMT INPAT INIT DAY: CPT

## 2019-07-05 PROCEDURE — 84484 ASSAY OF TROPONIN QUANT: CPT

## 2019-07-05 PROCEDURE — 05H633Z INSERTION OF INFUSION DEVICE INTO LEFT SUBCLAVIAN VEIN, PERCUTANEOUS APPROACH: ICD-10-PCS

## 2019-07-05 PROCEDURE — 87088 URINE BACTERIA CULTURE: CPT

## 2019-07-05 PROCEDURE — 81003 URINALYSIS AUTO W/O SCOPE: CPT

## 2019-07-05 PROCEDURE — 84132 ASSAY OF SERUM POTASSIUM: CPT

## 2019-07-05 PROCEDURE — 71045 X-RAY EXAM CHEST 1 VIEW: CPT

## 2019-07-05 PROCEDURE — 83880 ASSAY OF NATRIURETIC PEPTIDE: CPT

## 2019-07-05 PROCEDURE — 82274 ASSAY TEST FOR BLOOD FECAL: CPT

## 2019-07-05 PROCEDURE — 80053 COMPREHEN METABOLIC PANEL: CPT

## 2019-07-05 PROCEDURE — 94003 VENT MGMT INPAT SUBQ DAY: CPT

## 2019-07-05 PROCEDURE — 99292 CRITICAL CARE ADDL 30 MIN: CPT

## 2019-07-05 PROCEDURE — 87205 SMEAR GRAM STAIN: CPT

## 2019-07-05 PROCEDURE — 93925 LOWER EXTREMITY STUDY: CPT

## 2019-07-05 PROCEDURE — 31500 INSERT EMERGENCY AIRWAY: CPT

## 2019-07-05 PROCEDURE — 83735 ASSAY OF MAGNESIUM: CPT

## 2019-07-05 PROCEDURE — 87077 CULTURE AEROBIC IDENTIFY: CPT

## 2019-07-05 PROCEDURE — 87040 BLOOD CULTURE FOR BACTERIA: CPT

## 2019-07-05 PROCEDURE — 83690 ASSAY OF LIPASE: CPT

## 2019-07-05 PROCEDURE — 94640 AIRWAY INHALATION TREATMENT: CPT

## 2019-07-05 PROCEDURE — 99291 CRITICAL CARE FIRST HOUR: CPT

## 2019-07-05 PROCEDURE — 84145 PROCALCITONIN (PCT): CPT

## 2019-07-05 PROCEDURE — 87086 URINE CULTURE/COLONY COUNT: CPT

## 2019-07-05 PROCEDURE — 81015 MICROSCOPIC EXAM OF URINE: CPT

## 2019-07-05 PROCEDURE — 87493 C DIFF AMPLIFIED PROBE: CPT

## 2019-07-05 PROCEDURE — 80202 ASSAY OF VANCOMYCIN: CPT

## 2019-07-05 PROCEDURE — 85025 COMPLETE CBC W/AUTO DIFF WBC: CPT

## 2019-07-05 PROCEDURE — 87186 SC STD MICRODIL/AGAR DIL: CPT

## 2019-07-05 PROCEDURE — 87184 SC STD DISK METHOD PER PLATE: CPT

## 2019-07-05 PROCEDURE — 84100 ASSAY OF PHOSPHORUS: CPT

## 2019-07-05 PROCEDURE — 83605 ASSAY OF LACTIC ACID: CPT

## 2019-07-05 PROCEDURE — 85730 THROMBOPLASTIN TIME PARTIAL: CPT

## 2019-07-05 RX ADMIN — IPRATROPIUM BROMIDE SCH: 0.5 SOLUTION RESPIRATORY (INHALATION) at 06:00

## 2019-07-05 RX ADMIN — ACETAMINOPHEN PRN MG: 650 SUPPOSITORY RECTAL at 22:40

## 2019-07-05 RX ADMIN — ALBUTEROL SULFATE SCH: 2.5 SOLUTION RESPIRATORY (INHALATION) at 06:00

## 2019-07-05 RX ADMIN — ACETAMINOPHEN PRN MG: 650 SUPPOSITORY RECTAL at 11:33

## 2019-07-05 RX ADMIN — ARFORMOTEROL TARTRATE SCH MCG: 15 SOLUTION RESPIRATORY (INHALATION) at 13:10

## 2019-07-05 RX ADMIN — Medication SCH ML: at 20:54

## 2019-07-05 RX ADMIN — ARFORMOTEROL TARTRATE SCH MCG: 15 SOLUTION RESPIRATORY (INHALATION) at 20:00

## 2019-07-05 RX ADMIN — ALBUTEROL SULFATE SCH MG: 2.5 SOLUTION RESPIRATORY (INHALATION) at 20:00

## 2019-07-05 RX ADMIN — ALBUTEROL SULFATE SCH MG: 2.5 SOLUTION RESPIRATORY (INHALATION) at 13:10

## 2019-07-05 RX ADMIN — SODIUM CHLORIDE SCH MLS: 0.9 INJECTION, SOLUTION INTRAVENOUS at 10:55

## 2019-07-05 RX ADMIN — SODIUM CHLORIDE SCH MLS: 9 INJECTION, SOLUTION INTRAVENOUS at 20:53

## 2019-07-05 RX ADMIN — ENOXAPARIN SODIUM SCH MG: 40 INJECTION SUBCUTANEOUS at 10:56

## 2019-07-05 RX ADMIN — IPRATROPIUM BROMIDE SCH MG: 0.5 SOLUTION RESPIRATORY (INHALATION) at 13:10

## 2019-07-05 RX ADMIN — METHYLPREDNISOLONE SODIUM SUCCINATE SCH MG: 40 INJECTION, POWDER, FOR SOLUTION INTRAMUSCULAR; INTRAVENOUS at 17:13

## 2019-07-05 RX ADMIN — FENTANYL CITRATE PRN MCG: 50 INJECTION, SOLUTION INTRAMUSCULAR; INTRAVENOUS at 22:43

## 2019-07-05 RX ADMIN — Medication SCH ML: at 10:58

## 2019-07-05 RX ADMIN — ALBUTEROL SULFATE SCH MG: 2.5 SOLUTION RESPIRATORY (INHALATION) at 07:55

## 2019-07-05 RX ADMIN — IPRATROPIUM BROMIDE SCH MG: 0.5 SOLUTION RESPIRATORY (INHALATION) at 20:00

## 2019-07-05 RX ADMIN — METHYLPREDNISOLONE SODIUM SUCCINATE SCH MG: 40 INJECTION, POWDER, FOR SOLUTION INTRAMUSCULAR; INTRAVENOUS at 11:26

## 2019-07-05 RX ADMIN — SODIUM CHLORIDE SCH MLS: 9 INJECTION, SOLUTION INTRAVENOUS at 10:55

## 2019-07-05 RX ADMIN — IPRATROPIUM BROMIDE SCH MG: 0.5 SOLUTION RESPIRATORY (INHALATION) at 07:55

## 2019-07-05 NOTE — EDPHYS
Physician Documentation                                                                           

 [facility]                                                                 

Name: Olga Figueroa                                                                                  

Age: 68 yrs                                                                                       

Sex: Female                                                                                       

: 1950                                                                                   

MRN: [id number]                                                                                   

Arrival Date: 2019                                                                          

Time: 01:18                                                                                       

Account#: [id number]                                                                            

Bed 3                                                                                             

Private MD:                                                                                       

ED Physician Rick Ugalde                                                                       

HPI:                                                                                              

                                                                                             

02:46 This 68 yrs old  Female presents to ER via EMS with complaints of Unresponsive.gs  

02:46 Onset: The symptoms/episode began/occurred acutely, just prior to arrival. Duration:    gs  

      The symptoms are continuous, and are steadily getting worse. The patient's shortness of     

      breath is aggravated by nothing, is alleviated by nothing. Severity of symptoms: At         

      their worst the symptoms were incapacitating in the emergency department the symptoms       

      are unchanged. Unable to obtain HPI due to patient distress. It is unknown whether or       

      not the patient has had similar symptoms in the past.                                       

                                                                                                  

Historical:                                                                                       

- Allergies:                                                                                      

01:24 oxybutynin;                                                                             ak1 

- Home Meds:                                                                                      

01:24 alprazolam 1 mg Oral tab 1 tab 3 times per day [Active]; aspirin 81 mg Oral TbEC 1 tab  ak1 

      once daily [Active]; atorvastatin 10 mg Oral tab 1 tab once daily [Active]; Cymbalta 90     

      mg Oral once daily [Active]; cetirizine 10 mg Oral chew 1 tab once daily [Active];          

      fluticasone 50 mcg/actuation nasal spsn 1 spray 2 times per day [Active]; gabapentin        

      400 mg Oral cap 3 times per day [Active]; hydroxyzine HCl 25 mg Oral tab 1 tab 4 times      

      per day [Active]; ipratropium-albuterol 0.5 mg-3 mg(2.5 mg base)/3 mL Inhl nebu 3 mL 4      

      times per day [Active]; Lasix 20 mg Oral tab 1 tab 3 times per day [Active];                

      levothyroxine 25 mcg tab 1 tab once daily [Active]; Myrbetriq 25 mg Oral Tb24 1 tab         

      once daily [Active]; pantoprazole 40 mg Oral TbEC 1 tab 2 times per day [Active];           

      prednisone 10 mg Oral tab once daily [Active]; Triamcinolone Acetonide Topical              

      [Active]; tramadol 50 mg Oral tab 1 tab every 6 hours for Pain [Active]; temazepam 15       

      mg Oral cap 1 cap once daily [Active]; spironolactone 50 mg Oral tab 1 tab once daily       

      [Active]; simethicone 80 mg Oral chew every 8 hours [Active];                               

01:45 Seroquel 150mg Oral tab 1 tab nightly [Active]; acetaminophen-codeine 300-30 mg Oral    ak1 

      tab 1 tab every 8 hours as needed for Pain [Active]; prednisone 10 mg Oral tab 1 tab        

      once daily [Active];                                                                        

- PMHx:                                                                                           

01:24 PERIPHERAL NEUROPATHY; Hypothyroidism; Hypertension; HEART FAILURE; Emphysema;          ak1 

      Depression; Dementia; COPD; constipation; cognitive communication deficit; Cellulitis;      

      Bipolar disorder; Anxiety; Anemia;                                                          

- PSHx:                                                                                           

01:24 left Hip;                                                                               ak1 

                                                                                                  

- Immunization history:: Adult Immunizations unknown.                                             

- Social history:: Smoking status: unknown.                                                       

- Ebola Screening: : No symptoms or risks identified at this time.                                

                                                                                                  

                                                                                                  

ROS:                                                                                              

02:46 Unable to obtain ROS due to patient distress.                                           gs  

                                                                                                  

Exam:                                                                                             

02:46 Head/Face:  Normocephalic, atraumatic. Eyes:  Pupils equal round and reactive to light, gs  

      extra-ocular motions intact.  Lids and lashes normal.  Conjunctiva and sclera are           

      non-icteric and not injected.  Cornea within normal limits.  Periorbital areas with no      

      swelling, redness, or edema. ENT:  Nares patent. No nasal discharge, no septal              

      abnormalities noted.  Tympanic membranes are normal and external auditory canals are        

      clear.  Oropharynx with no redness, swelling, or masses, exudates, or evidence of           

      obstruction, uvula midline.  Mucous membranes moist. Neck:  Trachea midline, no             

      thyromegaly or masses palpated, and no cervical lymphadenopathy.  Supple, full range of     

      motion without nuchal rigidity, or vertebral point tenderness.  No Meningismus.             

      Chest/axilla:  Normal chest wall appearance and motion.  Nontender with no deformity.       

      No lesions are appreciated.                                                                 

02:46 Abdomen/GI:  Soft, non-tender, with normal bowel sounds.  No distension or tympany.  No     

      guarding or rebound.  No evidence of tenderness throughout. Back:  No spinal                

      tenderness.  No costovertebral tenderness.  Full range of motion.                           

02:46 Constitutional: The patient appears alert, awake, in obvious distress, severely             

      distressed.                                                                                 

02:46 Cardiovascular: Rate: tachycardic, Rhythm: regular, Pulses: no pulse deficits are           

      appreciated.                                                                                

02:46 ECG was reviewed by the Attending Physician.                                                

02:46 Respiratory: severe repiratory distress is noted,  Respirations: labored breathing,         

      Breath sounds: bronchial sounds, rhonchi.                                                   

02:46 Musculoskeletal/extremity: Perfusion: the patient is have sluggish capillary refill.        

02:46 Skin: Appearance: Color: cyanotic.                                                          

02:46 Neuro: Orientation: Not oriented to person, place, time, situation, Motor: moves all        

      fours, Sensation: no obvious gross deficits.                                                

                                                                                                  

Vital Signs:                                                                                      

01:18  / 81; Pulse 103; Resp 33; Pulse Ox 91% on 15% Non-rebreather mask; Weight 113.4  ak1 

      kg (R); Height 5 ft. 2 in. (157.48 cm) (R); Pain 0/10;                                      

01:45  / 86; Pulse 105; Resp 16; Pulse Ox 98% ;                                         ea  

02:40  / 80; Pulse 102; Resp 17; Temp 99; Pulse Ox 100% on ETT vent;                    ea  

03:00  / 81; Pulse 101; Resp 16; Temp 98.8; Pulse Ox 100% ;                             ea  

04:00  / 71; Pulse 94; Resp 19; Pulse Ox 100% on ETT vent;                              ea  

05:00  / 65; Pulse 97; Resp 18; Temp 100.5; Pulse Ox 99% on ETT vent;                   ea  

06:03 BP 99 / 74; Pulse 99; Resp 18; Temp 101; Pulse Ox 100% on ETT vent;                     ea  

06:30 BP 80 / 60; Pulse 97; Resp 18; Pulse Ox 100% on ETT vent;                               ea  

06:38 BP 81 / 59; Pulse 94; Resp 16; Pulse Ox 97% on ETT vent;                                ea  

06:50 BP 84 / 56; Pulse 95; Resp 16; Pulse Ox 100% on ETT vent;                               ea  

07:15  / 66; Pulse 93; Resp 18; Temp 101.4; Pulse Ox 100% ;                             ea  

07:45 BP 78 / 56; Pulse 108; Resp 18; Temp 101.2(C); Pulse Ox 97% on 50% FiO2 ETT vent;       ph  

08:05 BP 92 / 65; Pulse 111; Resp 18; Temp 101.4; Pulse Ox 97% on 50% FiO2 ETT vent;          ph  

01:18 Body Mass Index 45.73 (113.40 kg, 157.48 cm)                                            ak1 

                                                                                                  

Procedures:                                                                                       

02:46 Intubation: Ventilated with 100% NRB prior to procedure. Intubated orally using # 4     gs  

      Elizabeth blade with 7.5 mm ETT. was successful on first attempt. Ventilated with Ambu      

      bag. ventilator. Tube secured with ETT holguin Placement verified by CXR, CO2 detector       

      with (+) color change, auscultating bilateral breath sounds, Patient tolerated.             

                                                                                                  

MDM:                                                                                              

01:28 Patient medically screened.                                                               

02:46 Differential diagnosis: CHF exacerbation, Chronic Obstructive Pulmonary Disease         gs  

      Myocardial Infarction pneumonia. Data reviewed: vital signs, nurses notes.                  

                                                                                                  

                                                                                             

01:31 Order name: Basic Metabolic Panel                                                         

                                                                                             

01:31 Order name: Blood Culture Adult (2)                                                       

                                                                                             

01:31 Order name: CBC with Diff                                                                 

                                                                                             

01:31 Order name: Lactate                                                                       

                                                                                             

01:31 Order name: LFT's                                                                         

                                                                                             

01:31 Order name: Lipase                                                                        

                                                                                             

01:31 Order name: Procalcitonin; Complete Time: 02:34                                           

                                                                                             

01:31 Order name: Protime (+inr); Complete Time: 02:14                                          

                                                                                             

01:31 Order name: Troponin (emerg Dept Use Only); Complete Time: 02:34                          

                                                                                             

01:31 Order name: Urine Microscopic Only; Complete Time: 03:58                                  

                                                                                             

01:32 Order name: Basic Metabolic Panel; Complete Time: 02:34                                 EDMS

                                                                                             

01:32 Order name: Blood Culture                                                               [facility]

                                                                                             

01:32 Order name: CBC with Automated Diff; Complete Time: 02:34                               EDMS

                                                                                             

01:32 Order name: Lactate; Complete Time: 03:58                                               EDMS

                                                                                             

01:31 Order name: Chest Single View XRAY                                                        

                                                                                             

01:31 Order name: CT Head Brain wo Cont                                                         

                                                                                             

01:32 Order name: Liver (Hepatic) Function; Complete Time: 02:34                              EDMS

                                                                                             

01:32 Order name: Lipase; Complete Time: 02:34                                                EDMS

                                                                                             

02:23 Order name: CBC Smear Scan; Complete Time: 02:34                                        EDMS

                                                                                             

02:35 Order name: Urine Dipstick--Ancillary (enter results); Complete Time: 02:52             ar5 

                                                                                             

02:52 Order name: ABG; Complete Time: 04:40                                                   gs  

                                                                                             

02:52 Order name: Sputum Culture                                                                

                                                                                             

04:51 Order name: Troponin I                                                                  EDMS

                                                                                             

04:52 Order name: Troponin I                                                                  EDMS

                                                                                             

06:30 Order name: Lactate Sepsis 2 HR Follow-up                                               EDMS

                                                                                             

08:05 Order name: Chest Single View XRAY                                                      hb  

                                                                                             

08:28 Order name: US                                                                          EDMS

                                                                                             

09:10 Order name: RAD                                                                         EDMS

                                                                                             

01:31 Order name: Accucheck; Complete Time: 01:50                                             gs  

                                                                                             

01:31 Order name: Cardiac monitoring; Complete Time: 01:50                                    gs  

                                                                                             

01:31 Order name: EKG - Nurse/Tech; Complete Time: 03:10                                      gs  

                                                                                             

01:31 Order name: IV Saline Lock - Large Bore; Complete Time: 01:50                           gs  

                                                                                             

01:31 Order name: Labs collected and sent; Complete Time: 01:50                               gs  

                                                                                             

01:31 Order name: O2 Per Protocol; Complete Time: 01:50                                       gs  

                                                                                             

01:31 Order name: O2 Sat Monitoring; Complete Time: 01:51                                     gs  

                                                                                             

01:31 Order name: Urine Dipstick-Ancillary (obtain specimen); Complete Time: 03:21            gs  

                                                                                             

01:43 Order name: Early; Complete Time: 01:46                                                 cp  

                                                                                             

04:51 Order name: CONS Physician Consult                                                      [facility]

                                                                                             

04:52 Order name: NPO                                                                         [facility]

                                                                                             

06:18 Order name: Restraint:Non-Violent; Complete Time: 06:18                                 ea  

                                                                                                  

EC:46 Rate is 105 beats/min. Rhythm is regular. SD interval is normal. T waves are Inverted.  gs  

      T waves are Flattened. Clinical impression: Abnormal EKG without significant change.        

      Interpreted by me.                                                                          

                                                                                                  

Administered Medications:                                                                         

01:22 Drug: Etomidate 10 mg Route: IVP; Site: right forearm;                                  rv  

01:30 Follow up: Response: No adverse reaction                                                ea  

01:24 Drug: Succinylcholine 120 mg Route: IVP; Site: right forearm;                           rv  

01:30 Follow up: Response: No adverse reaction                                                ea  

02:20 Drug: Zosyn 3.375 grams Route: IVPB; Infused Over: 60 mins; Site: left forearm;         ea  

04:22 Follow up: Response: No adverse reaction; IV Status: Completed infusion; IV Intake:     ea  

      100ml                                                                                       

02:24 Drug: NS 0.9% 1000 ml Route: IV; Rate: 1 bolus; Site: left forearm;                     rv  

03:00 Follow up: Response: No adverse reaction; IV Status: Completed infusion; IV Intake:     ea  

      1000ml                                                                                      

03:57 Drug: NS 0.9% 2400 ml Route: IV; Rate: 1 bolus; Site: left forearm;                     ea  

06:02 Follow up: Response: No adverse reaction; IV Status: Completed infusion; IV Intake:     ea  

      2400ml                                                                                      

04:22 Drug: LevaQUIN 750 mg Volume: 150 ml; Route: IVPB; Infused Over: 90 mins; Site: right   ea  

      forearm;                                                                                    

05:45 Follow up: Response: No adverse reaction; IV Status: Completed infusion; IV Intake:     ea  

      150ml                                                                                       

04:52 Drug: fentaNYL (PF) 75 mcg Route: IVP; Site: left forearm;                              ea  

05:47 Follow up: Response: No adverse reaction                                                ea  

05:16 Drug: Tylenol Suppository 650 mg Route: SD;                                             ea  

08:40 Follow up: Response: No adverse reaction                                                ph  

05:46 Drug: vancoMYCIN 1 grams Route: IVPB; Infused Over: 2 hrs; Site: right forearm;         ea  

07:06 Not Given (Hemodynamic Parameters): Versed 2 mg IVP once                                ea  

07:15 Drug: Levophed (4 mg/250 mL D5W 4 mcg/min {Note: initiated at 2 mcg/min.} Route: IV;    ph  

      Rate: calculated rate; Site: left forearm;                                                  

08:39 Follow up: IV Status: Infusion continued upon admission                                 ph  

                                                                                                  

                                                                                                  

Disposition:                                                                                      

02:46 Critical Care:.                                                                         gs  

                                                                                                  

Disposition:                                                                                      

19 02:51 Hospitalization ordered by Mahogany Gan for Inpatient Admission. Preliminary     

  diagnosis is Acute respiratory failure.                                                         

- Bed requested for Intensive Care Unit.                                                          

- Status is Inpatient Admission.                                                              hb  

- Condition is Stable.                                                                            

- Problem is new.                                                                                 

- Symptoms have improved.                                                                         

UTI on Admission? Yes                                                                             

                                                                                                  

                                                                                                  

                                                                                                  

Critical care time excluding procedures:                                                          

02:46 Critical care time: Bedside Care: 10 minutes, Consultation: 10 minutes, Family          gs  

      Intervention: 10 minutes. Total time: 30 minutes                                            

                                                                                                  

Signatures:                                                                                       

Dispatcher [employer]                           Jenna Fofana RN                       RN   ak1                                                  

Yazmin Guerra RN                      RN   ph                                                   

Mehrdad Lazo PA PA cp Garcia, Cindy, RN RN                                                      

Emily Orellana RN RN                                                      

Chrissie Myers RN RN ea Starr, Gregory, MD MD                                                      

Will Rojas, RN                    MELVIN   rv                                                   

                                                                                                  

Corrections: (The following items were deleted from the chart)                                    

01:46 01:24 Home Meds: Seroquel 100 mg Oral tab 2 tabs daily; ak1                             ak1 

06:18 02:51 Hospitalization Ordered by Mahogany Gan MD for Inpatient Admission. Preliminary  cg  

      diagnosis is Acute respiratory failure. Bed requested for Telemetry/MedSurg                 

      (Inpatient). Status is Inpatient Admission. Condition is Stable. Problem is new.            

      Symptoms have improved. UTI on Admission? Yes.                                            

09:17 06:18 2019 02:51 Hospitalization Ordered by Mahogany Gan MD for Inpatient          

      Admission. Preliminary diagnosis is Acute respiratory failure. Bed requested for            

      Intensive Care Unit. Status is Inpatient Admission. Condition is Stable. Problem is         

      new. Symptoms have improved. UTI on Admission? Yes. cg                                      

                                                                                                  

**************************************************************************************************

## 2019-07-05 NOTE — RAD REPORT
EXAM DESCRIPTION:  US - Lower Extremity Arterial Bilat - 7/5/2019 7:55 am

 

CLINICAL HISTORY:  Leg pain

 

COMPARISON:  September 2018

 

FINDINGS:  Waveform of the common femoral and superficial femoral arteries bilaterally are triphasic.


 

The waveform of the posterior tibial and dorsalis pedis arteries bilaterally and right popliteal britni
ry are biphasic. The evaluation of the left popliteal artery could not be performed due to technical 
issues with positioning.

 

A significant stenosis/occlusion not seen

 

IMPRESSION:  No significant abnormality displayed

## 2019-07-05 NOTE — ER
Nurse's Notes                                                                                     

 [facility]                                                                 

Name: Olga Figueroa                                                                                  

Age: 68 yrs                                                                                       

Sex: Female                                                                                       

: 1950                                                                                   

MRN: [id number]                                                                                   

Arrival Date: 2019                                                                          

Time: 01:18                                                                                       

Account#: [id number]                                                                            

Bed 3                                                                                             

Private MD:                                                                                       

Diagnosis: Acute respiratory failure                                                              

                                                                                                  

Presentation:                                                                                     

                                                                                             

01:18 Presenting complaint: EMS states: toned out for unresponsive pt in resp distress. pt is ak1 

      from [facility] and was said to be A\T\OX4 at 19. pt remains unresponsive upon    

      arrival to ER3. Transition of care: patient was received from another setting of care       

      (long-term care facility), [facility]. Onset of symptoms was 2019. Risk           

      Assessment: Do you want to hurt yourself or someone else? Patient reports no desire to      

      harm self or others. Initial Sepsis Screen: Does the patient meet any 2 criteria? No.       

      Patient's initial sepsis screen is negative. Does the patient have a suspected source       

      of infection? No. Patient's initial sepsis screen is negative. Note EMS stated pt was       

      92% on Nonrebreather. Care prior to arrival: IV initiated. 20 GA, in the right              

      antecubital area.                                                                           

01:18 Method Of Arrival: EMS: [facility]                                                ak1 

01:18 Acuity: LO 1                                                                           ak1 

                                                                                                  

Triage Assessment:                                                                                

01:18 General: Behavior is unresponsive.                                                      ak1 

01:24 Pain: Unable to use pain scale. Patient is unresponsive.                                ak1 

                                                                                                  

Historical:                                                                                       

- Allergies:                                                                                      

01:24 oxybutynin;                                                                             ak1 

- Home Meds:                                                                                      

01:24 alprazolam 1 mg Oral tab 1 tab 3 times per day [Active]; aspirin 81 mg Oral TbEC 1 tab  ak1 

      once daily [Active]; atorvastatin 10 mg Oral tab 1 tab once daily [Active]; Cymbalta 90     

      mg Oral once daily [Active]; cetirizine 10 mg Oral chew 1 tab once daily [Active];          

      fluticasone 50 mcg/actuation nasal spsn 1 spray 2 times per day [Active]; gabapentin        

      400 mg Oral cap 3 times per day [Active]; hydroxyzine HCl 25 mg Oral tab 1 tab 4 times      

      per day [Active]; ipratropium-albuterol 0.5 mg-3 mg(2.5 mg base)/3 mL Inhl nebu 3 mL 4      

      times per day [Active]; Lasix 20 mg Oral tab 1 tab 3 times per day [Active];                

      levothyroxine 25 mcg tab 1 tab once daily [Active]; Myrbetriq 25 mg Oral Tb24 1 tab         

      once daily [Active]; pantoprazole 40 mg Oral TbEC 1 tab 2 times per day [Active];           

      prednisone 10 mg Oral tab once daily [Active]; Triamcinolone Acetonide Topical              

      [Active]; tramadol 50 mg Oral tab 1 tab every 6 hours for Pain [Active]; temazepam 15       

      mg Oral cap 1 cap once daily [Active]; spironolactone 50 mg Oral tab 1 tab once daily       

      [Active]; simethicone 80 mg Oral chew every 8 hours [Active];                               

01:45 Seroquel 150mg Oral tab 1 tab nightly [Active]; acetaminophen-codeine 300-30 mg Oral    ak1 

      tab 1 tab every 8 hours as needed for Pain [Active]; prednisone 10 mg Oral tab 1 tab        

      once daily [Active];                                                                        

- PMHx:                                                                                           

01:24 PERIPHERAL NEUROPATHY; Hypothyroidism; Hypertension; HEART FAILURE; Emphysema;          ak1 

      Depression; Dementia; COPD; constipation; cognitive communication deficit; Cellulitis;      

      Bipolar disorder; Anxiety; Anemia;                                                          

- PSHx:                                                                                           

01:24 left Hip;                                                                               ak1 

                                                                                                  

- Immunization history:: Adult Immunizations unknown.                                             

- Social history:: Smoking status: unknown.                                                       

- Ebola Screening: : No symptoms or risks identified at this time.                                

                                                                                                  

                                                                                                  

Screenin:22 Abuse screen: Denies threats or abuse. Denies injuries from another. Nutritional        ak1 

      screening: No deficits noted. Tuberculosis screening: No symptoms or risk factors           

      identified. Fall Risk Gait- Normal/Bed Rest/Wheelchair (0 pts).                             

                                                                                                  

Assessment:                                                                                       

01:53 General: Appears distressed, obese. Pain: Unable to use pain scale. Patient is          rv  

      unresponsive. Neuro: Level of Consciousness is unresponsive, Oriented to none.              

02:25 Reassessment: patient came back from CT scan. ET tube adjusted by RT at level 21 teeth  rv  

      (0215).                                                                                     

02:28 Cardiovascular: Rhythm is sinus tachycardia. Respiratory: Breath sounds with crackles   rv  

      bilaterally. GI: Abdomen is round distended. : No signs and/or symptoms were reported     

      regarding the genitourinary system. EENT: No signs and/or symptoms were reported            

      regarding the EENT system. Derm: Bruising that is dark purple, on right arm and left        

      arm Decubitus located on lateral side of the left foot. Musculoskeletal: Range of           

      motion: limited in left shoulder and left elbow.                                            

03:15 Reassessment: Patient and/or family updated on plan of care and expected duration. Pain ea  

      level reassessed. Pt responds to painful stimulus, pt remains intubated, tolerating         

      well. No s/s of pain or discomfort noted at this time.                                      

04:30 Reassessment: Patient and/or family updated on plan of care and expected duration. Pain ea  

      level reassessed. Pt remains intubated, tolerating well, responds to painful stimulus.      

      No s/s of pain or discomfort noted at this time. Daughter remains at bedside.               

05:30 Reassessment: Patient and/or family updated on plan of care and expected duration. Pain ea  

      level reassessed. Pt responds to painful stimulus, remains intubated, pt tolerating         

      well. No s/s of pain or discomfort noted at this time. Pt daughter remains at bedside.      

06:00 Reassessment: Patient and/or family updated on plan of care and expected duration. Pain ea  

      level reassessed. Pt temp increased to 101, cool towel placed on forehead. Pt responds      

      to painful stimulus. Pt continues to be intubated, tolerating well.                         

06:20 Reassessment: Patient and/or family updated on plan of care and expected duration. Pain ea  

      level reassessed. Cool rags placed under arm pits and groin.                                

06:32 Reassessment: Pt hypotensive, attempted to call hospitalist, unable to reach physician  ea  

      at this time. ED physician notified of hypotension and elevated temp.                       

06:55 Reassessment: Dr Francois at bedside.                                                     ea  

07:35 Reassessment: Patient appears in no apparent distress at this time. Patient and/or      ph  

      family updated on plan of care and expected duration. Pain level reassessed. Dr Red     

      at bedside to place subclavian central line, consent signed by daughter.                    

08:34 Reassessment: Patient appears in no apparent distress at this time. Patient and/or      ph  

      family updated on plan of care and expected duration. Pain level reassessed. Report         

      called to MELVIN Dias, awaiting results of CXR before taking pt to ICU.                   

                                                                                                  

Vital Signs:                                                                                      

01:18  / 81; Pulse 103; Resp 33; Pulse Ox 91% on 15% Non-rebreather mask; Weight 113.4  ak1 

      kg (R); Height 5 ft. 2 in. (157.48 cm) (R); Pain 0/10;                                      

01:45  / 86; Pulse 105; Resp 16; Pulse Ox 98% ;                                         ea  

02:40  / 80; Pulse 102; Resp 17; Temp 99; Pulse Ox 100% on ETT vent;                    ea  

03:00  / 81; Pulse 101; Resp 16; Temp 98.8; Pulse Ox 100% ;                             ea  

04:00  / 71; Pulse 94; Resp 19; Pulse Ox 100% on ETT vent;                              ea  

05:00  / 65; Pulse 97; Resp 18; Temp 100.5; Pulse Ox 99% on ETT vent;                   ea  

06:03 BP 99 / 74; Pulse 99; Resp 18; Temp 101; Pulse Ox 100% on ETT vent;                     ea  

06:30 BP 80 / 60; Pulse 97; Resp 18; Pulse Ox 100% on ETT vent;                               ea  

06:38 BP 81 / 59; Pulse 94; Resp 16; Pulse Ox 97% on ETT vent;                                ea  

06:50 BP 84 / 56; Pulse 95; Resp 16; Pulse Ox 100% on ETT vent;                               ea  

07:15  / 66; Pulse 93; Resp 18; Temp 101.4; Pulse Ox 100% ;                             ea  

07:45 BP 78 / 56; Pulse 108; Resp 18; Temp 101.2(C); Pulse Ox 97% on 50% FiO2 ETT vent;       ph  

08:05 BP 92 / 65; Pulse 111; Resp 18; Temp 101.4; Pulse Ox 97% on 50% FiO2 ETT vent;          ph  

01:18 Body Mass Index 45.73 (113.40 kg, 157.48 cm)                                            ak1 

                                                                                                  

ED Course:                                                                                        

01:18 Patient arrived in ED.                                                                  ak1 

01:18 Arm band placed on Patient placed in an exam room, on a stretcher, on oxygen, on        ak1 

      cardiac monitor, on pulse oximetry.                                                         

01:20 Inserted saline lock: 22 gauge in right forearm, using aseptic technique. Blood         rv  

      collected.                                                                                  

01:21 Triage completed.                                                                       ak1 

01:22 Patient has correct armband on for positive identification. Placed in gown. Bed in low  ak1 

      position. Call light in reach. Side rails up X2. Cardiac monitor on. Pulse ox on. NIBP      

      on.                                                                                         

01:22 Maintain EMS IV. Dressing intact. Site clean \T\ dry. Gauge \T\ site: 20g Right AC.         ak
1

01:24 Assisted provider with intubation using 7.5 mm ETT via oral route. ET tube secured at   rv  

      22cm at the teeth. Intubated by Rick Ugalde MD Placement verified by CXR, CO2             

      detector w/ + color change, auscultating bilateral breath sounds.                           

01:28 Rick Ugalde MD is Attending Physician.                                              gs  

01:40 Inserted saline lock: 20 gauge in left forearm, using aseptic technique.                rv  

01:51 Placed Assist ventilation with ventilator.                                              rv  

01:53 X-ray completed. Portable x-ray completed in exam room. Patient tolerated procedure     kw  

      well.                                                                                       

01:54 Chest Single View XRAY In Process Unspecified.                                          EDMS

02:10 Chrissie Myers, MELVIN is Primary Nurse.                                                    ea  

02:17 CT Head Brain wo Cont In Process Unspecified.                                           EDMS

02:51 Mahogany Gan MD is Hospitalizing Provider.                                           gs  

06:03 Patient admitted, IV remains in place.                                                  ea  

06:55 Report given to Yazmin CHARLES.                                                            ea  

07:35 Assisted provider with central line placement. Set up central line tray. Triple lumen   ph  

      line placed in left subclavian. Line placed by David Red MD Placement verified by         

      CXR, blood return, Dressed with Tegaderm, Patient tolerated well. Before procedure, did     

      Practitioner(s) obtain informed consent? Yes. Patient \T\ family education about            

      procedure, CLABSI prevention and S/S of infection? Yes. Time-out/Briefing performed         

      prior to start of procedure? Yes. Was handwashing/sanitizing done immediately prior to      

      procedure? Yes. Was patient positioned to in a way to prevent air embolism? Yes. Was        

      procedure site sterilized? Yes, with chlorhexidine. Was the site allowed to dry? Yes.       

      Was local anesthetic and/or sedation utilized? Yes. During the procedure, did the           

      Practitioner(s) maintain a sterile field? Were unused ports clamped during insertion?       

      Yes. Was a 2nd qualified MD obtained after 3 unsuccessful insertion attempts? No. Was       

      blood aspirated from each lumen? Yes. After the procedure, did the Practitioner(s)          

      clean the site and apply a sterile dressing? Yes.                                           

07:54 Yazmin Guerra, RN is Primary Nurse.                                                    ph  

08:28 X-ray completed. Portable x-ray completed in exam room. Patient tolerated procedure     jb2 

      well.                                                                                       

                                                                                                  

Restraints:                                                                                       

02:00 Non-Violent Restraint: Order obtained. Initiated on 2019 at 02:00 Unable to    ea  

      provide Restraint education. pt intubated and only responds to painful stimulus.            

      Actions/Behavior observed: repeated attempts to remove artifical airway/mechanical resp     

      support, Cognition: unable to follow commands, Circulation: Within defined parameters       

      (based on Cardiovascular assessment) Restraint status: Soft wrist restraint (Right)         

      Started. Soft wrist restraint (Left) Started.                                               

04:00 Non-Violent Restraint: Actions/Behavior observed: repeated attempts to remove artifical ea  

      airway/mechanical resp support, Cognition: unable to follow commands, Circulation:          

      Within defined parameters (based on Cardiovascular assessment) Restraint status: Soft       

      wrist restraint (Right) Continued. Soft wrist restraint (Left) Continued.                   

06:00 Non-Violent Restraint: Actions/Behavior observed: repeated attempts to remove artifical ea  

      airway/mechanical resp support, Cognition: unable to follow commands, Circulation:          

      Within defined parameters (based on Cardiovascular assessment) Restraint status: Soft       

      wrist restraint (Right) Continued. Soft wrist restraint (Left) Continued.                   

                                                                                                  

Administered Medications:                                                                         

01:22 Drug: Etomidate 10 mg Route: IVP; Site: right forearm;                                  rv  

01:30 Follow up: Response: No adverse reaction                                                ea  

01:24 Drug: Succinylcholine 120 mg Route: IVP; Site: right forearm;                           rv  

01:30 Follow up: Response: No adverse reaction                                                ea  

02:20 Drug: Zosyn 3.375 grams Route: IVPB; Infused Over: 60 mins; Site: left forearm;         ea  

04:22 Follow up: Response: No adverse reaction; IV Status: Completed infusion; IV Intake:     ea  

      100ml                                                                                       

02:24 Drug: NS 0.9% 1000 ml Route: IV; Rate: 1 bolus; Site: left forearm;                     rv  

03:00 Follow up: Response: No adverse reaction; IV Status: Completed infusion; IV Intake:     ea  

      1000ml                                                                                      

03:57 Drug: NS 0.9% 2400 ml Route: IV; Rate: 1 bolus; Site: left forearm;                     ea  

06:02 Follow up: Response: No adverse reaction; IV Status: Completed infusion; IV Intake:     ea  

      2400ml                                                                                      

04:22 Drug: LevaQUIN 750 mg Volume: 150 ml; Route: IVPB; Infused Over: 90 mins; Site: right   ea  

      forearm;                                                                                    

05:45 Follow up: Response: No adverse reaction; IV Status: Completed infusion; IV Intake:     ea  

      150ml                                                                                       

04:52 Drug: fentaNYL (PF) 75 mcg Route: IVP; Site: left forearm;                              ea  

05:47 Follow up: Response: No adverse reaction                                                ea  

05:16 Drug: Tylenol Suppository 650 mg Route: MA;                                             ea  

08:40 Follow up: Response: No adverse reaction                                                ph  

05:46 Drug: vancoMYCIN 1 grams Route: IVPB; Infused Over: 2 hrs; Site: right forearm;         ea  

07:06 Not Given (Hemodynamic Parameters): Versed 2 mg IVP once                                ea  

07:15 Drug: Levophed (4 mg/250 mL D5W 4 mcg/min {Note: initiated at 2 mcg/min.} Route: IV;    ph  

      Rate: calculated rate; Site: left forearm;                                                  

08:39 Follow up: IV Status: Infusion continued upon admission                                 ph  

                                                                                                  

                                                                                                  

Intake:                                                                                           

03:00 IV: 1000ml; Total: 1000ml.                                                              ea  

04:22 IV: 100ml; Total: 1100ml.                                                               ea  

05:45 IV: 150ml; Total: 1250ml.                                                               ea  

06:02 IV: 2400ml; Total: 3650ml.                                                              ea  

                                                                                                  

Output:                                                                                           

08:19 Urine: 600ml (Early); Total: 600ml.                                                     ph  

                                                                                                  

Outcome:                                                                                          

02:51 Decision to Hospitalize by Provider.                                                      

05:26 Instructed on Pt family notified of need for admit.                                     ea  

08:36 Admitted to ICU accompanied by nurse, accompanied by tech, via stretcher, room 6, with  ph  

      oxygen, on monitor, with chart, Other Accompanied by RT Report called to  MELVIN Dias     

08:36 critical                                                                                    

09:17 Patient left the ED.                                                                      

                                                                                                  

Signatures:                                                                                       

Dispatcher [employer]                           EDMS                                                 

Nick Wilson Kimberlee kw Krenek, Amber, RN                       RN   ak1                                                  

Yazmin Guerra RN RN ph Baxter, Heather, RN RN                                                      

Chrissie Myers RN RN ea Starr, Gregory, MD MD gs Vicente, Ronaldo, RN RN   rv                                                   

                                                                                                  

Corrections: (The following items were deleted from the chart)                                    

01:46 01:24 Home Meds: Seroquel 100 mg Oral tab 2 tabs daily; ak1                             ak1 

07:23 06:03 BP 99 / 74; Pulse 18bpm; Resp 99bpm; Pulse Ox 100% ET / Ventilator; Temp 101F; ea ea  

                                                                                                  

**************************************************************************************************

## 2019-07-05 NOTE — P.PN
Subjective


Date of Service: 07/05/19 (Hospitalist note)


Chief Complaint: Respiratory failure





Patient is 68 years of age with end-stage COPD bed-bound admitted with altered 

mental status recently discharged from [facility] with EES BAL infection 

and was treated with IV antibiotics currently patient is on a ventilator alert 

responsive cooperative with a question night face





Review of Systems


is unable to be obtained





Physical Examination





- Vital Signs


Temperature: 101.4 F


Blood Pressure: 92/65


Pulse: 111


Respirations: 18





- Physical Exam


General: Alert, Cooperative


Respiratory: Clear to auscultation bilaterally, Diminished


Cardiovascular: Edema


Gastrointestinal: Normal bowel sounds, Soft and benign, Non-distended


Integumentary: Other (Patient has a decubitus ulcer on the left foot)





- Studies


Laboratory Data (last 24 hrs)





07/05/19 01:45: PT 11.4, INR 0.96


07/05/19 01:45: WBC 13.6 H, Hgb 13.4, Hct 42.4, Plt Count 292


07/05/19 01:45: Sodium 136, Potassium 5.1, BUN 12, Creatinine 0.97, Glucose 120 

H, Total Bilirubin 0.5, AST 13 L, ALT 16, Alkaline Phosphatase 140 H, Lipase 31 

L





Microbiology Data (last 24 hrs): 








07/05/19 02:54   Blood  - Blood   Anaerobic Blood Culture - Final








Assessment & Plan





- Problems (Diagnosis)


(1) Respiratory failure


Current Visit: Yes   Status: Acute   


Plan: 


Patient is 68 years of age admitted with acute on chronic respiratory failure 

plan to titrate sat to 90% repeat arterial blood gases chest x-rays very 

agitated and unable to evaluate for pneumonia white count is mildly elevated 

patient has a fever she has also hypotensive plan is to continue with IV 

antibiotics await cultures


Qualifiers: 


   Chronicity: acute on chronic   Respiratory failure complication: hypoxia and 

hypercapnia   Qualified Code(s): J96.21 - Acute and chronic respiratory failure 

with hypoxia; J96.22 - Acute and chronic respiratory failure with hypercapnia

## 2019-07-05 NOTE — RAD REPORT
EXAM DESCRIPTION:  CT - Head Brain Wo Cont - 7/5/2019 2:43 am

 

CLINICAL HISTORY:  The patient is 68 years old and is Female; DECLINING STATE

 

TECHNIQUE:  Axial computed tomography images of the head/brain without intravenous contrast.   Sagitt
al and coronal reformatted images were created and reviewed.   This CT exam was performed using one o
r more of the following dose reduction techniques:   automated exposure control, adjustment of the mA
 and/or kV according to patient size, and/or use of iterative reconstruction technique.

 

COMPARISON:  No relevant prior studies available.

 

FINDINGS:  BRAIN:   There is diffuse cerebral atrophy present, consistent with this patient's age.   
There is patchy hypoattenuation of the deep white matter which is non-specific, but most likely owing
 to chronic small vessel ischemic change in a patient of this age group.   No intracranial hemorrhage
, mass effect, or midline shift is seen.

   VENTRICLES:   There is diffuse prominence of the ventricles, which is likely related to central at
rophy.

   BONES/JOINTS:   No acute fracture.

   SOFT TISSUES:   Unremarkable.

   SINUSES:   Fluid is noted within the right maxillary sinus, nasal passages, and right sphenoid sin
us.

   MASTOID AIR CELLS:   Fluid is present within the right mastoid air cells.

 

IMPRESSION:  1.   Age-related atrophy and chronic white matter ischemic changes, with no evidence of 
an acute intracranial abnormality.

 

2.   Right mastoid effusion.

 

Electronically signed by:   Lila Hess MD   7/5/2019 2:36 AM CDT Workstation: [id number]

 

 

Due to temporary technical issues with the PACS/Fluency reporting system, reports are being signed by
 the in house radiologist as a courtesy to ensure prompt reporting. The interpreting radiologist is f
ully responsible for the content of the report.

## 2019-07-05 NOTE — HP
Date of Admission:  07/05/2019



Reason For Admission:  

1.Respiratory failure.

2.COPD exacerbation.

3.The patient with history of dementia.

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

5.The patient with decubitus.



History Of Present Illness:  This is a 68-year-old female who came to the hospital with shortness of 
breath.  The patient was living at [facility] and became difficult to arouse.  The keven
ent has recently been to [facility] in [place] for treatment of a persistent urinary tract infection.
  She came back about a week and half ago.  She had been doing well up until the episode last night o
f being difficult to arouse and having some respiratory distress.  She came into the emergency room, 
and her initial workup revealed that she had a blood gas with a pH of 7.28, pCO2 of 70, pO2 was 190 o
n 100% nonrebreather.  However, she was not arousing, so decision was made to intubate.  The patient 
currently is doing better on the ventilator.  She is saturating 100%.  She had a chest x-ray performe
d as well which revealed some chronic lung changes, but no acute pathology noted.  The patient's lact
ic acid was elevated at 3.0.  The patient will be admitted to the hospital to the intensive care unit
.



Review of Systems:

A 10-point review of systems is unable to be obtained because the patient is on the ventilator.



Past Medical History:  COPD, hypertension, hypothyroidism, depression, dementia, cushingoid features 
secondary to prednisone chronic use, diastolic congestive heart failure.



Past Surgical History:  The patient has had prior hip surgery.



Allergies:  TO PENICILLIN AND OXYBUTYNIN.



Family History:  Noncontributory.



Social History:  She had a long term use of smoking, which she stopped many years ago.  She does not 
do drugs.  No alcohol use.



Medications:  She is on multiple chronic medications and medications have been reviewed, and once the
y are okayed, we will go ahead and review them.



Physical Examination:

Vitals Signs:  Revealed a temp of 100.7, blood pressure 120/80, heart rate is 103, respirations 30, i
nitially saturating 91% on a nonrebreather, and the patient was difficult to arouse.  Currently vital
s; temp is 100.1, respirations 24, blood pressure 100/60, saturating 100% on AC of 18, tidal volume o
f 550 with a FiO2 of 60%. 

General:  The patient was just given Versed and so is unresponsive, and she is intubated and sedated.
 

HEENT:  Normocephalic, atraumatic.  Pupils are equal and reactive.  Extraocular muscles are intact. 

Neck:  Unable to assess JVP because she has a thick neck and cushingoid features.  No bruits. 

Cardiovascular:  Regular rate and rhythm.  No murmur. 

Lungs:  Coarse breath sounds throughout. 

Abdomen:  Distended.  Nontender.  Bowel sounds positive. 

Extremities:  No clubbing, no cyanosis, no edema.  She has some cyanotic features diffusely. 

Neuro:  Difficult to examine.



Laboratory Data:  Labs have been reviewed.



Assessment:  

1.The patient with respiratory failure.

2.Chronic obstructive pulmonary disease exacerbation.

3.Altered mental status.

4.History of dementia.

5.History of congestive heart failure, diastolic dysfunction.

6.History of chronic steroid use.

7.History of type 2 diabetes.



Plan:  

1.At this time is to continue on mechanical ventilation.

2.Pulmonary consultation.

3.Nebs, steroids, and IV antibiotics.

4.Gentle diuresing if blood pressure tolerates.

5.Strict blood pressure and blood sugar control.

6.Reassess neuro status.

7.Repeat ABGs.

8.May need dedicated CT of the chest if symptoms are not improving.

9.GI and DVT prophylaxis. 



Critical care time spent with patient was 55 minutes.





REBEKAH

DD:  07/05/2019 05:43:52Voice ID:  [id number]

## 2019-07-05 NOTE — P.OP
Preoperative diagnosis: Need for Venous Access


Postoperative diagnosis: Need for Venous Access


Primary procedure: Placement of LEFT Subclavian Central Venous Catheter


Secondary procedure: Microintroducer used


Anesthesia: 1% Lidocaine


Estimated blood loss: <5cc


Specimen: None


Findings: Non-pulsatile blood returned


Complications: None


Implants: Triple Lumen Central Line


Transferred to: ICU


Condition: Serious

## 2019-07-05 NOTE — RAD REPORT
EXAM DESCRIPTION:  [facility] Single View7/5/2019 8:32 am

 

CLINICAL HISTORY:  Device placement/central venous catheter placement

 

COMPARISON:  July 5, 2019

 

FINDINGS:  Tip of a central venous catheter lies within the superior vena cava. A pneumothorax is not
 present.

 

IMPRESSION:  Central venous catheter with its tips in the superior vena cava

## 2019-07-05 NOTE — OP
Date of Procedure:  07/05/2019



Surgeon:  David Red MD, MD



Preoperative Diagnosis:  Need for venous access.



Postoperative Diagnosis:  Need for venous access.



Procedure Performed:  Placement of left subclavian central venous catheter using microintroducer set.




Anesthesia:  1% lidocaine used.



Estimated Blood Loss:  5 cc.



Specimen:  None.



Findings:  Nonpulsatile blood return.



Complications:  None.



Implants:  Triple lumen central line.



Disposition:  The patient transferred to ICU in serious condition.



Procedure In Detail:  After informed consent was obtained per the patient's family, the patient was p
laced in steep Trendelenburg.  Using a micropuncture set, I anesthetized the area of the left subclav
celeste vein.  I then inserted the microintroducer needle into this left subclavian vein on first attempt
.  Dark red nonpulsatile blood was returned.  The microwire was advanced.  The needle was removed and
 a small nick incision was made over the insertion site.  The microintroducer sheath was then placed 
using Seldinger technique over the wire.  The inner cannula was taken out along with the wire.  The w
gabo out was called at this time and the standard triple-lumen catheter wire was then placed into the 
microintroducer sheath.  The microintroducer sheath was then removed and using Seldinger technique, t
he tract was dilated and the central venous catheter was placed using Seldinger technique over the wi
re without evidence of complication.  The wire was called for the second time using the standard wire
 and dark red nonpulsatile blood was returned and flushed through all ports easily without evidence o
f complication.  The catheter was then secured to the chest using the 2-0 silk suture and a sterile d
ressing was placed over the top.  The patient tolerated the procedure well without evidence of compli
cation.  Remained in the ER in critical condition and we transferred to the ICU in serious condition.
  All counts were correct at the end of the case.





DHRUV/MODL

DD:  07/05/2019 08:17:09Voice ID:  [id number]

DT:  07/05/2019 13:08:35Report ID:  [id number]

## 2019-07-05 NOTE — RAD REPORT
EXAM DESCRIPTION:  [facility] Single View7/5/2019 1:58 am

 

CLINICAL HISTORY:  Device placement endotracheal tube placement

 

COMPARISON:  March 2019

 

FINDINGS:   An endotracheal tube has been inserted with its tip 3.7 centimeters above the torie

 

Lungs appear clear of acute infiltrate. Heart is mildly enlarged

## 2019-07-06 LAB
ALBUMIN SERPL BCP-MCNC: 2.5 G/DL (ref 3.4–5)
ALP SERPL-CCNC: 102 U/L (ref 45–117)
ALT SERPL W P-5'-P-CCNC: 12 U/L (ref 12–78)
AST SERPL W P-5'-P-CCNC: 8 U/L (ref 15–37)
BUN BLD-MCNC: 12 MG/DL (ref 7–18)
COHGB MFR BLDA: 1.3 % (ref 0–1.5)
GLUCOSE SERPLBLD-MCNC: 174 MG/DL (ref 74–106)
HCT VFR BLD CALC: 34.6 % (ref 36–45)
INR BLD: 1.19
LYMPHOCYTES # SPEC AUTO: 0.3 K/UL (ref 0.7–4.9)
MAGNESIUM SERPL-MCNC: 1.9 MG/DL (ref 1.8–2.4)
MORPHOLOGY BLD-IMP: (no result)
NT-PROBNP SERPL-MCNC: 1374 PG/ML (ref ?–125)
OXYHGB MFR BLDA: 93.6 % (ref 94–97)
PMV BLD: 8.7 FL (ref 7.6–11.3)
POTASSIUM SERPL-SCNC: 2.8 MMOL/L (ref 3.5–5.1)
RBC # BLD: 3.75 M/UL (ref 3.86–4.86)
SAO2 % BLDA: 95.5 % (ref 92–98.5)

## 2019-07-06 RX ADMIN — ARFORMOTEROL TARTRATE SCH MCG: 15 SOLUTION RESPIRATORY (INHALATION) at 07:34

## 2019-07-06 RX ADMIN — METHYLPREDNISOLONE SODIUM SUCCINATE SCH MG: 40 INJECTION, POWDER, FOR SOLUTION INTRAMUSCULAR; INTRAVENOUS at 00:15

## 2019-07-06 RX ADMIN — ALBUTEROL SULFATE SCH MG: 2.5 SOLUTION RESPIRATORY (INHALATION) at 01:35

## 2019-07-06 RX ADMIN — Medication SCH ML: at 09:33

## 2019-07-06 RX ADMIN — IPRATROPIUM BROMIDE SCH MG: 0.5 SOLUTION RESPIRATORY (INHALATION) at 01:35

## 2019-07-06 RX ADMIN — SODIUM CHLORIDE SCH: 0.9 INJECTION, SOLUTION INTRAVENOUS at 07:40

## 2019-07-06 RX ADMIN — ALBUTEROL SULFATE SCH MG: 2.5 SOLUTION RESPIRATORY (INHALATION) at 20:00

## 2019-07-06 RX ADMIN — SODIUM CHLORIDE SCH MG: 0.9 INJECTION, SOLUTION INTRAVENOUS at 01:02

## 2019-07-06 RX ADMIN — SODIUM CHLORIDE SCH: 0.9 INJECTION, SOLUTION INTRAVENOUS at 21:00

## 2019-07-06 RX ADMIN — SODIUM CHLORIDE SCH MLS: 9 INJECTION, SOLUTION INTRAVENOUS at 09:34

## 2019-07-06 RX ADMIN — IPRATROPIUM BROMIDE SCH MG: 0.5 SOLUTION RESPIRATORY (INHALATION) at 13:47

## 2019-07-06 RX ADMIN — SODIUM CHLORIDE SCH MG: 0.9 INJECTION, SOLUTION INTRAVENOUS at 20:58

## 2019-07-06 RX ADMIN — SODIUM CHLORIDE SCH MLS: 9 INJECTION, SOLUTION INTRAVENOUS at 10:34

## 2019-07-06 RX ADMIN — SODIUM CHLORIDE SCH MLS: 9 INJECTION, SOLUTION INTRAVENOUS at 20:58

## 2019-07-06 RX ADMIN — Medication SCH ML: at 20:59

## 2019-07-06 RX ADMIN — SODIUM CHLORIDE SCH MLS: 0.9 INJECTION, SOLUTION INTRAVENOUS at 02:05

## 2019-07-06 RX ADMIN — ALBUTEROL SULFATE SCH MG: 2.5 SOLUTION RESPIRATORY (INHALATION) at 07:34

## 2019-07-06 RX ADMIN — FENTANYL CITRATE PRN MCG: 50 INJECTION, SOLUTION INTRAMUSCULAR; INTRAVENOUS at 02:07

## 2019-07-06 RX ADMIN — SODIUM CHLORIDE SCH MG: 0.9 INJECTION, SOLUTION INTRAVENOUS at 09:33

## 2019-07-06 RX ADMIN — FENTANYL CITRATE PRN MCG: 50 INJECTION, SOLUTION INTRAMUSCULAR; INTRAVENOUS at 13:22

## 2019-07-06 RX ADMIN — METHYLPREDNISOLONE SODIUM SUCCINATE SCH MG: 40 INJECTION, POWDER, FOR SOLUTION INTRAMUSCULAR; INTRAVENOUS at 16:40

## 2019-07-06 RX ADMIN — ALBUTEROL SULFATE SCH MG: 2.5 SOLUTION RESPIRATORY (INHALATION) at 13:47

## 2019-07-06 RX ADMIN — ARFORMOTEROL TARTRATE SCH MCG: 15 SOLUTION RESPIRATORY (INHALATION) at 20:00

## 2019-07-06 RX ADMIN — FENTANYL CITRATE PRN MCG: 50 INJECTION, SOLUTION INTRAMUSCULAR; INTRAVENOUS at 19:34

## 2019-07-06 RX ADMIN — POTASSIUM CHLORIDE SCH MLS: 200 INJECTION, SOLUTION INTRAVENOUS at 15:27

## 2019-07-06 RX ADMIN — IPRATROPIUM BROMIDE SCH MG: 0.5 SOLUTION RESPIRATORY (INHALATION) at 07:34

## 2019-07-06 RX ADMIN — POTASSIUM CHLORIDE SCH MLS: 200 INJECTION, SOLUTION INTRAVENOUS at 18:06

## 2019-07-06 RX ADMIN — IPRATROPIUM BROMIDE SCH MG: 0.5 SOLUTION RESPIRATORY (INHALATION) at 20:00

## 2019-07-06 RX ADMIN — ENOXAPARIN SODIUM SCH MG: 40 INJECTION SUBCUTANEOUS at 09:33

## 2019-07-06 RX ADMIN — METHYLPREDNISOLONE SODIUM SUCCINATE SCH MG: 40 INJECTION, POWDER, FOR SOLUTION INTRAMUSCULAR; INTRAVENOUS at 09:32

## 2019-07-06 NOTE — P.PN
Subjective


Date of Service: 07/06/19 (Hospitalist)


Chief Complaint: Respiratory failure


Patient's condition is stable fever is declining still on low-dose Levophed 

hemodynamically stable oxygenation satisfactory unresponsive





Review of Systems


is unable to be obtained





Physical Examination





- Vital Signs


Temperature: 101.4 F


Blood Pressure: 92/65


Pulse: 111


Respirations: 18


Pulse Ox (%): 100





- Physical Exam


General: Unresponsive


HEENT: Atraumatic


Neck: Supple


Respiratory: Diminished


Cardiovascular: Normal S1 S2, Edema





- Studies


Microbiology Data (last 24 hrs): 








07/05/19 02:54   Blood  - Blood   Anaerobic Blood Culture - Final


07/05/19 02:00   Sputum   Gram Stain - Final








Assessment & Plan





- Problems (Diagnosis)


(1) Respiratory failure


Current Visit: Yes   Status: Acute   


Plan: 


Patient admitted with respiratory failure febrile all cultures are negative 

mildly tachycardic on FiO2 of 30% white count is 15.5 slightly elevated 

hemoglobin stable patient is hypokalemic normal renal function BNP elevated 

blood gases satisfactory on FiO2 30% her PO2 is 82, pCO2 45 day shows COPD 

changes wound culture and C. difficile stains are pending will plan to wean off 

the ventilator fluid boluses


Qualifiers: 


   Chronicity: acute on chronic   Respiratory failure complication: hypoxia and 

hypercapnia   Qualified Code(s): J96.21 - Acute and chronic respiratory failure 

with hypoxia; J96.22 - Acute and chronic respiratory failure with hypercapnia

## 2019-07-06 NOTE — RAD REPORT
EXAM DESCRIPTION:  RAD - Chest Single View - 7/6/2019 7:24 am

 

CLINICAL HISTORY:  vented

Chest pain.

 

COMPARISON:  Chest Single View dated 7/5/2019; Chest Single View dated 7/5/2019; Chest Single View da
lucrecia 3/1/2019; Chest Single View dated 9/5/2018; Thorax Wo Con dated 8/15/2017

 

FINDINGS:  Portable technique and patient positioning limits examination quality.

 

Tip of the ET tube is above the torie. Left-sided venous catheter its tip in the SVC. The lungs appe
ar emphysematous with small vague oblong left mid lung opacity seen, may be infectious in etiology. C
onsideration may be given to CT chest followup assessment.

## 2019-07-07 LAB
BUN BLD-MCNC: 14 MG/DL (ref 7–18)
COHGB MFR BLDA: 1.2 % (ref 0–1.5)
GLUCOSE SERPLBLD-MCNC: 128 MG/DL (ref 74–106)
MAGNESIUM SERPL-MCNC: 2.2 MG/DL (ref 1.8–2.4)
OXYHGB MFR BLDA: 94.1 % (ref 94–97)
POTASSIUM SERPL-SCNC: 4.3 MMOL/L (ref 3.5–5.1)
SAO2 % BLDA: 95.8 % (ref 92–98.5)

## 2019-07-07 RX ADMIN — FENTANYL CITRATE PRN MCG: 50 INJECTION, SOLUTION INTRAMUSCULAR; INTRAVENOUS at 20:10

## 2019-07-07 RX ADMIN — IPRATROPIUM BROMIDE SCH MG: 0.5 SOLUTION RESPIRATORY (INHALATION) at 08:30

## 2019-07-07 RX ADMIN — ALBUTEROL SULFATE SCH MG: 2.5 SOLUTION RESPIRATORY (INHALATION) at 08:30

## 2019-07-07 RX ADMIN — IPRATROPIUM BROMIDE SCH MG: 0.5 SOLUTION RESPIRATORY (INHALATION) at 01:43

## 2019-07-07 RX ADMIN — Medication SCH ML: at 20:44

## 2019-07-07 RX ADMIN — IPRATROPIUM BROMIDE SCH MG: 0.5 SOLUTION RESPIRATORY (INHALATION) at 19:40

## 2019-07-07 RX ADMIN — METHYLPREDNISOLONE SODIUM SUCCINATE SCH MG: 40 INJECTION, POWDER, FOR SOLUTION INTRAMUSCULAR; INTRAVENOUS at 10:01

## 2019-07-07 RX ADMIN — ALBUTEROL SULFATE SCH MG: 2.5 SOLUTION RESPIRATORY (INHALATION) at 01:43

## 2019-07-07 RX ADMIN — Medication SCH ML: at 10:02

## 2019-07-07 RX ADMIN — MIDAZOLAM PRN MG: 1 INJECTION INTRAMUSCULAR; INTRAVENOUS at 21:12

## 2019-07-07 RX ADMIN — SODIUM CHLORIDE SCH MLS: 9 INJECTION, SOLUTION INTRAVENOUS at 11:05

## 2019-07-07 RX ADMIN — ARFORMOTEROL TARTRATE SCH MCG: 15 SOLUTION RESPIRATORY (INHALATION) at 08:30

## 2019-07-07 RX ADMIN — ARFORMOTEROL TARTRATE SCH MCG: 15 SOLUTION RESPIRATORY (INHALATION) at 19:40

## 2019-07-07 RX ADMIN — ALBUTEROL SULFATE SCH MG: 2.5 SOLUTION RESPIRATORY (INHALATION) at 19:40

## 2019-07-07 RX ADMIN — SODIUM CHLORIDE SCH MLS: 0.9 INJECTION, SOLUTION INTRAVENOUS at 02:31

## 2019-07-07 RX ADMIN — FENTANYL CITRATE PRN MCG: 50 INJECTION, SOLUTION INTRAMUSCULAR; INTRAVENOUS at 02:29

## 2019-07-07 RX ADMIN — ENOXAPARIN SODIUM SCH MG: 40 INJECTION SUBCUTANEOUS at 10:01

## 2019-07-07 RX ADMIN — ALBUTEROL SULFATE SCH MG: 2.5 SOLUTION RESPIRATORY (INHALATION) at 14:07

## 2019-07-07 RX ADMIN — FENTANYL CITRATE PRN MCG: 50 INJECTION, SOLUTION INTRAMUSCULAR; INTRAVENOUS at 14:16

## 2019-07-07 RX ADMIN — PROPOFOL PRN MLS: 10 INJECTION, EMULSION INTRAVENOUS at 20:59

## 2019-07-07 RX ADMIN — IPRATROPIUM BROMIDE SCH MG: 0.5 SOLUTION RESPIRATORY (INHALATION) at 14:07

## 2019-07-07 RX ADMIN — METHYLPREDNISOLONE SODIUM SUCCINATE SCH MG: 40 INJECTION, POWDER, FOR SOLUTION INTRAMUSCULAR; INTRAVENOUS at 00:01

## 2019-07-07 RX ADMIN — FENTANYL CITRATE PRN MCG: 50 INJECTION, SOLUTION INTRAMUSCULAR; INTRAVENOUS at 06:35

## 2019-07-07 RX ADMIN — DEXTROSE MONOHYDRATE SCH MLS: 50 INJECTION, SOLUTION INTRAVENOUS at 10:00

## 2019-07-07 RX ADMIN — METHYLPREDNISOLONE SODIUM SUCCINATE SCH MG: 40 INJECTION, POWDER, FOR SOLUTION INTRAMUSCULAR; INTRAVENOUS at 16:09

## 2019-07-07 RX ADMIN — SODIUM CHLORIDE SCH MLS: 9 INJECTION, SOLUTION INTRAVENOUS at 10:02

## 2019-07-07 RX ADMIN — FENTANYL CITRATE PRN MCG: 50 INJECTION, SOLUTION INTRAMUSCULAR; INTRAVENOUS at 17:01

## 2019-07-07 RX ADMIN — SODIUM CHLORIDE SCH MG: 0.9 INJECTION, SOLUTION INTRAVENOUS at 10:01

## 2019-07-07 RX ADMIN — CEFTRIAXONE SCH MLS: 1 INJECTION, SOLUTION INTRAVENOUS at 16:08

## 2019-07-07 NOTE — RAD REPORT
EXAM DESCRIPTION:  RAD - Chest Single View - 7/7/2019 6:52 am

 

CLINICAL HISTORY:  Respiratory distress, intubation

 

COMPARISON:  July 6

 

TECHNIQUE:  AP portable chest image was obtained 0643 hours .

 

FINDINGS:  ET tube is unchanged in position. Left subclavian central line unchanged. NG tube extends 
below the diaphragm, off the field of view.

 

Prominent interstitial markings in both lung fields are stable. No new lung parenchymal process. No s
ignificant failure or volume overload.

 

 Heart and vasculature are normal. No measurable pleural effusion and no pneumothorax. No acute bony 
abnormality seen. No acute aortic findings suspected.

 

IMPRESSION:  Stable chest from prior day study.

## 2019-07-07 NOTE — P.PN
Subjective


Date of Service: 07/07/19


Chief Complaint: Respiratory failure


Patient is more alert responsive cooperative failed weaning trial yesterday 

hemodynamically stable





Review of Systems


is unable to be obtained





Physical Examination





- Vital Signs


Temperature: 98.9 F


Blood Pressure: 127/79


Pulse: 97


Respirations: 24


Pulse Ox (%): 96





- Physical Exam


General: Alert, Cooperative


Respiratory: Clear to auscultation bilaterally, Diminished


Cardiovascular: Edema





- Studies


Microbiology Data (last 24 hrs): 








07/05/19 02:00   Sputum   Gram Stain - Final








Assessment & Plan





- Problems (Diagnosis)


(1) Respiratory failure


Current Visit: Yes   Status: Acute   


Plan: 


Respiratory failure hemodynamically stable plan to wean and extubate today 

hypernatremia change to D5 water blood cultures all negative sputum is Mr SA 

positive patient is on vancomycin and meropenem white count is mildly elevated 

reduce the dose of steroids


Qualifiers: 


   Chronicity: acute on chronic   Respiratory failure complication: hypoxia and 

hypercapnia   Qualified Code(s): J96.21 - Acute and chronic respiratory failure 

with hypoxia; J96.22 - Acute and chronic respiratory failure with hypercapnia

## 2019-07-08 VITALS — TEMPERATURE: 99.1 F | SYSTOLIC BLOOD PRESSURE: 102 MMHG | DIASTOLIC BLOOD PRESSURE: 62 MMHG

## 2019-07-08 VITALS — OXYGEN SATURATION: 100 %

## 2019-07-08 LAB
BUN BLD-MCNC: 13 MG/DL (ref 7–18)
COHGB MFR BLDA: 1.3 % (ref 0–1.5)
GLUCOSE SERPLBLD-MCNC: 117 MG/DL (ref 74–106)
HCT VFR BLD CALC: 32.1 % (ref 36–45)
LYMPHOCYTES # SPEC AUTO: 0.4 K/UL (ref 0.7–4.9)
MAGNESIUM SERPL-MCNC: 2.2 MG/DL (ref 1.8–2.4)
OXYHGB MFR BLDA: 93.5 % (ref 94–97)
PMV BLD: 8.5 FL (ref 7.6–11.3)
POTASSIUM SERPL-SCNC: 3.7 MMOL/L (ref 3.5–5.1)
RBC # BLD: 3.44 M/UL (ref 3.86–4.86)
SAO2 % BLDA: 95.4 % (ref 92–98.5)

## 2019-07-08 RX ADMIN — ALBUTEROL SULFATE SCH MG: 2.5 SOLUTION RESPIRATORY (INHALATION) at 19:43

## 2019-07-08 RX ADMIN — Medication SCH ML: at 10:18

## 2019-07-08 RX ADMIN — SODIUM CHLORIDE SCH MLS: 9 INJECTION, SOLUTION INTRAVENOUS at 10:18

## 2019-07-08 RX ADMIN — FENTANYL CITRATE PRN MCG: 50 INJECTION, SOLUTION INTRAMUSCULAR; INTRAVENOUS at 00:00

## 2019-07-08 RX ADMIN — MIDAZOLAM PRN MG: 1 INJECTION INTRAMUSCULAR; INTRAVENOUS at 08:19

## 2019-07-08 RX ADMIN — DEXTROSE MONOHYDRATE SCH MLS: 50 INJECTION, SOLUTION INTRAVENOUS at 19:32

## 2019-07-08 RX ADMIN — ALBUTEROL SULFATE SCH MG: 2.5 SOLUTION RESPIRATORY (INHALATION) at 07:30

## 2019-07-08 RX ADMIN — IPRATROPIUM BROMIDE SCH MG: 0.5 SOLUTION RESPIRATORY (INHALATION) at 07:30

## 2019-07-08 RX ADMIN — METHYLPREDNISOLONE SODIUM SUCCINATE SCH MG: 40 INJECTION, POWDER, FOR SOLUTION INTRAMUSCULAR; INTRAVENOUS at 00:00

## 2019-07-08 RX ADMIN — DEXTROSE MONOHYDRATE SCH MLS: 50 INJECTION, SOLUTION INTRAVENOUS at 04:00

## 2019-07-08 RX ADMIN — ARFORMOTEROL TARTRATE SCH MCG: 15 SOLUTION RESPIRATORY (INHALATION) at 19:43

## 2019-07-08 RX ADMIN — PROPOFOL PRN MLS: 10 INJECTION, EMULSION INTRAVENOUS at 13:19

## 2019-07-08 RX ADMIN — ALBUTEROL SULFATE SCH MG: 2.5 SOLUTION RESPIRATORY (INHALATION) at 01:20

## 2019-07-08 RX ADMIN — IPRATROPIUM BROMIDE SCH MG: 0.5 SOLUTION RESPIRATORY (INHALATION) at 01:20

## 2019-07-08 RX ADMIN — Medication SCH ML: at 20:26

## 2019-07-08 RX ADMIN — PROPOFOL PRN MLS: 10 INJECTION, EMULSION INTRAVENOUS at 22:16

## 2019-07-08 RX ADMIN — IPRATROPIUM BROMIDE SCH MG: 0.5 SOLUTION RESPIRATORY (INHALATION) at 13:45

## 2019-07-08 RX ADMIN — DEXTROSE MONOHYDRATE SCH: 50 INJECTION, SOLUTION INTRAVENOUS at 09:00

## 2019-07-08 RX ADMIN — ENOXAPARIN SODIUM SCH MG: 40 INJECTION SUBCUTANEOUS at 10:17

## 2019-07-08 RX ADMIN — ARFORMOTEROL TARTRATE SCH MCG: 15 SOLUTION RESPIRATORY (INHALATION) at 07:30

## 2019-07-08 RX ADMIN — IPRATROPIUM BROMIDE SCH MG: 0.5 SOLUTION RESPIRATORY (INHALATION) at 19:43

## 2019-07-08 RX ADMIN — FENTANYL CITRATE PRN MCG: 50 INJECTION, SOLUTION INTRAMUSCULAR; INTRAVENOUS at 15:03

## 2019-07-08 RX ADMIN — PROPOFOL PRN MLS: 10 INJECTION, EMULSION INTRAVENOUS at 18:10

## 2019-07-08 RX ADMIN — FENTANYL CITRATE PRN MCG: 50 INJECTION, SOLUTION INTRAMUSCULAR; INTRAVENOUS at 22:56

## 2019-07-08 RX ADMIN — ALBUTEROL SULFATE SCH MG: 2.5 SOLUTION RESPIRATORY (INHALATION) at 13:45

## 2019-07-08 RX ADMIN — CEFTRIAXONE SCH MLS: 1 INJECTION, SOLUTION INTRAVENOUS at 17:17

## 2019-07-08 RX ADMIN — FENTANYL CITRATE PRN MCG: 50 INJECTION, SOLUTION INTRAMUSCULAR; INTRAVENOUS at 06:34

## 2019-07-08 NOTE — RAD REPORT
EXAM DESCRIPTION:  RAD - Chest Single View - 7/8/2019 5:39 am

 

CLINICAL HISTORY:  Intubation, respiratory distress

 

COMPARISON:  July 7

 

TECHNIQUE:  AP portable chest image was obtained 0532 hours .

 

FINDINGS:  ETT and left subclavian central line are unchanged. NG tube extends below the diaphragm. C
hronic interstitial lung disease is present. No new or progressive lung parenchymal process. Cardiome
diastinal silhouette is stable. No measurable pleural effusion and no pneumothorax. No acute aortic f
indings suspected.

 

IMPRESSION:  Tubes and lines are unchanged.

 

No new or progressive lung parenchymal process.

## 2019-07-08 NOTE — PN
Date of Progress Note:  07/08/2019



Subjective:  The patient seen and examined.  Chart reviewed and case discussed 
with RN and Dr. Zhao.  The patient able to nod her head, yes and no.  
Currently still on the ventilator with difficulty weaning.  

Medications list reviewed.



Code Status:  Full.



Physical Examination:

Vital Signs:  Temperature 99.2, heart rate 90, blood pressure 135/66, 
respirations 21, O2 100% on ET tube, 30% FiO2. 

General:  Awake, alert, intubated, not sedated, able to respond to commands, 
nod yes and no.  Opens eyes spontaneously. 

CV:  S1, S2.  Regular rate and rhythm.  Peripheral pulses present. 

Respiratory:  Diminished breath sounds bilaterally.  Some wheezing heard.  No 
use of accessory muscles. 

Gastrointestinal:  Abdomen is soft, nontender, nondistended.  Positive bowel 
sounds. 

Extremities:  No clubbing, cyanosis, or significant peripheral edema. 

Neurologic:  Nonfocal.  The patient is able to move all 4 extremities.  Opens 
eyes spontaneously, is intubated, follows commands. 

Skin:  No rashes.  Normal skin turgor.



Laboratory Data:  Sodium 145, potassium 3.7, chloride 109, CO2 29, BUN 13, 
creatinine 0.95, glucose 117, calcium 7.8, phosphorus 2.3, magnesium 2.2.  WBC 
13.4, H and H 10.2 and 32.1, platelets 266, neutrophils 91%.  pH 7.41, pCO2 41.9
, PO2 76, bicarb 26.2.  Wound culture from the left foot growing out MRSA.  
Sputum culture growing out Proteus MRSA and Haemophilus influenzae.  
Sensitivities noted.  Proteus and Haemophilus sensitive to Rocephin.  MRSA 
sensitive to vancomycin.  Chest x-ray personally reviewed shows tubes and lines 
unchanged.  No new or progressive lung parenchymal process.  Chronic 
interstitial lung disease is present.



Assessment And Plan:  A 68-year-old female with:

1.   Acute respiratory failure with hypoxia, still on mechanical ventilation, 
difficult to wean.  Appreciate Dr. Zhao's input.

2.   Chronic obstructive pulmonary disease with acute exacerbation.  Chest x-
ray did not show any new changes, personally reviewed.  The patient is still 
requiring mechanical ventilation.  We will continue steroids.

3.   Acute metabolic encephalopathy, unclear etiology, may be CO2 narcosis, 
improved.  The patient able to follow commands.

4.   History of dementia, Alzheimer's type, early onset without behavioral 
disturbance, stable.

5.   Chronic diastolic heart failure.  The patient will be added on Aldactone.  
We will continue to monitor I's and O's and continue fluid restriction.

6.   Diabetes mellitus type 2 with hyperglycemia.  We will continue with 
sliding scale insulin and monitor Accu-Cheks.

7.   Essential hypertension.

8.   Hypothyroidism, stable.

9.   Major depressive disorder, stable.

10.   Deep vein thrombosis prophylaxis addressed.

11.   Obesity, BMI 34.



Plan:  We will continue to monitor in ICU setting, wean off the ventilator as 
tolerated as per Dr. Zhao's recommendations. 



Overall poor prognosis.  We will refer to LTAC.





/MODL

DD:  07/08/2019 13:22:43   Voice ID:  [id number]

DT:  07/08/2019 18:19:15   Report ID:  [id number]

ELINOR

## 2019-07-08 NOTE — P.PN
Subjective


Date of Service: 07/08/19


Chief Complaint: Respiratory failure


No change unable to wean the patient off the ventilator patient required IV 

propofol I last night unresponsive to worse that she is not tolerating weaning





Review of Systems


is unable to be obtained





Physical Examination





- Vital Signs


Temperature: 99.2 F


Blood Pressure: 135/66


Pulse: 90


Respirations: 21


Pulse Ox (%): 100





- Physical Exam


General: Unresponsive


Respiratory: Clear to auscultation bilaterally, Diminished


Cardiovascular: Edema


Gastrointestinal: Normal bowel sounds, Soft and benign





- Studies


Microbiology Data (last 24 hrs): 








07/05/19 02:00   Sputum   Gram Stain - Final


07/05/19 02:00   Sputum   Culture & Sensitivity - Final


                            Proteus Mirabilis


                            Meth Resistant Staph Aureus


                            Haemophilus Influenzae Ii








Assessment & Plan





- Problems (Diagnosis)


(1) Respiratory failure


Current Visit: Yes   Status: Acute   


Plan: 


Patient admitted with respiratory failure failure to wean off the ventilator 

multiple organisms isolated including Mr GARDNER and Haemophilus currently on 

vancomycin and Rocephin oxygenation satisfactory start tube feeds hypernatremia 

is corrected.  Meds reconciled resume spironolactone she is very [place] 

knowing change to p.o. prednisone prognosis very poor consider hospice care 

[facility] facility


Qualifiers: 


   Chronicity: acute on chronic   Respiratory failure complication: hypoxia and 

hypercapnia   Qualified Code(s): J96.21 - Acute and chronic respiratory failure 

with hypoxia; J96.22 - Acute and chronic respiratory failure with hypercapnia

## 2019-07-10 NOTE — DS
Date of Discharge:  07/08/2019



Consultants:  Dr. Red, General Surgery; Dr. Zhao with Pulmonology.



Procedures:  Left subclavian central venous catheter placement.



Admitting Diagnoses:  

1.Acute respiratory failure with hypoxia.

2.Acute COPD exacerbation.

3.Acute metabolic encephalopathy.

4.Diastolic congestive heart failure, chronic.

5.History of chronic steroid use.

6.Type 2 diabetes mellitus with hyperglycemia.

7.Dementia.

8.Depression.

9.Hypothyroidism.



Discharge Diagnoses:  

1.Acute respiratory failure with hypoxia.

2.Chronic obstructive pulmonary disease with acute exacerbation.

3.Acute metabolic encephalopathy.

4.History of dementia, Alzheimer's type, early onset without behavioral disturbance.

5.Chronic diastolic heart failure.

6.Diabetes mellitus, type 2, with hyperglycemia, non-insulin-requiring.

7.Essential hypertension.

8.Hypothyroidism.

9.Major depressive disorder, single episode.

10.Obesity, BMI 34.

11.Hypokalemia.

12.Hypernatremia.

13.Left foot wound secondary to MRSA.

14.Pneumonia secondary to MRSA Proteus and Haemophilus.



Hospital Course:  The patient is a 68-year-old female with multiple comorbid conditions including hyp
ertension, hypothyroidism, dementia, depression, congestive heart failure, COPD, who has been [facility] for the past several years, comes in with shortness of breath.  The patient is a resident of [facility].  The patient was found to be acidotic with hypercapnia and pCO2 of 70.  The patient was plac
ed on non-rebreather, however, did not improve.  Therefore, she was intubated.  The patient had eleva
lucrecia lactate level.  Chest x-ray revealed some chronic lung changes, but no acute infiltrates.  She wa
s started on breathing treatments and IV steroids.  Dr. Red was consulted to place a central line
.  The patient was also seen by Dr. Zhao for ventilation management.  The patient was very diffic
ult to wean.  Repeat ABGs were done.  The patient did have a fever and white count was mildly elevate
d.  Therefore, IV antibiotics were also continued.  The patient required Levophed, which was eventual
ly weaned.  The patient's cultures grew out Proteus, MRSA, and Haemophilus influenzae from the sputum
 and grew out MRSA from the left foot wound.  The patient's family requested LTAC placement and the p
atient was transferred to LTAC for further ventilator weaning and the patient was then transferred to
 LTAC while still being ventilated.  The patient's overall prognosis is poor.  She wishes to be full 
code.  Discussed with family, even though they understand that most likely she should be DNR.  Chrissy han, her wishes are to be full code.  For physical exam findings, please see progress note dictated on 
day of discharge. 



Total time spent transferring the patient was 43 minutes.





MULUGETA

DD:  07/09/2019 16:00:47Voice ID:  [id number]

DT:  07/10/2019 00:38:59Report ID:  [id number]

## 2019-07-16 NOTE — CON
Date of Consultation:  07/05/2019



Brief History Of Present Illness:  Patient is a 68-year-old female who came to the hospital with shor
tness of breath and evidence of septic shock.  The ER physician states that he felt he was unable to 
get IV access and the patient had no IV access.  As such, I was called emergently to place an emergen
t central line into the patient.  As such, a complete H and P could not be obtained as the patient wa
s unable to speak, being ventilated on a respirator at the time of my examination.  Therefore, no his
tory is obtainable other than the chart, which was reviewed quickly.



Past Medical History:  Significant for COPD, hypertension, hypothyroidism, depression, dementia, cush
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



Past Surgical History:  Hip surgery.



Allergies:  PENICILLIN AND OXYBUTYNIN.



Family History:  Unable to obtain.



Social History:  Unable to obtain.



Medication History:  Unable to obtain.



Review of Systems:

Unable to obtain.



Physical Examination:

Patient is currently intubated, tachycardic, tachypneic, and lethargic.



Assessment And Plan:  As such, I feel that this patient is in emergent need of IV access and therefor
e I planned to place a left subclavian central venous catheter.  I have reviewed the risks, benefits,
 and alternatives of the above stated plan with patient's daughter who is at the bedside.  She agrees
 to proceed as indicated. 



Thank you for this interesting consult.





DHRUV/JUAN CARLOS

DD:  07/16/2019 09:24:49Voice ID:  [id number]

DT:  07/16/2019 13:48:21Report ID:  [id number]

## 2024-12-07 NOTE — RAD REPORT
EXAM DESCRIPTION:  RAD - Chest Single View - 9/5/2018 5:38 pm

 

CLINICAL HISTORY:  DYSPNEA

Chest pain.

 

COMPARISON:  Chest Single View dated 2/10/2018; Chest Single View dated 8/13/2017; Chest Single View 
dated 6/14/2017; Chest Single View dated 6/11/2017; Abdomen   Pelvis W Contrast dated 9/5/2018

 

FINDINGS:  Portable and patient positioning technique limits examination quality.

 

Linear scarring suspected in right lung. Patient rotation limits assessment of the right lung. Left l
paloma is grossly clear. The heart is mildly prominent with a tortuous thoracic aorta.

 

IMPRESSION:  No acute intrathoracic process suspected. Patient being dc home at this time. Patient provided dc instructions; pt verbalized understanding of dc instructions.  Patient being dc in stable condition.